# Patient Record
Sex: MALE | Race: WHITE | Employment: OTHER | URBAN - METROPOLITAN AREA
[De-identification: names, ages, dates, MRNs, and addresses within clinical notes are randomized per-mention and may not be internally consistent; named-entity substitution may affect disease eponyms.]

---

## 2021-06-14 ENCOUNTER — HOSPITAL ENCOUNTER (OUTPATIENT)
Dept: PREADMISSION TESTING | Age: 76
Discharge: HOME | End: 2021-06-14
Payer: COMMERCIAL

## 2021-06-14 VITALS
RESPIRATION RATE: 16 BRPM | HEART RATE: 104 BPM | OXYGEN SATURATION: 95 % | SYSTOLIC BLOOD PRESSURE: 127 MMHG | WEIGHT: 270 LBS | HEIGHT: 73 IN | DIASTOLIC BLOOD PRESSURE: 71 MMHG | BODY MASS INDEX: 35.78 KG/M2 | TEMPERATURE: 97.1 F

## 2021-06-14 LAB
ANION GAP SERPL CALC-SCNC: 5 MMOL/L (ref 5–15)
BUN SERPL-MCNC: 16 MG/DL (ref 6–20)
BUN/CREAT SERPL: 16 (ref 12–20)
CALCIUM SERPL-MCNC: 9.1 MG/DL (ref 8.5–10.1)
CHLORIDE SERPL-SCNC: 102 MMOL/L (ref 97–108)
CO2 SERPL-SCNC: 34 MMOL/L (ref 21–32)
CREAT SERPL-MCNC: 1 MG/DL (ref 0.7–1.3)
GLUCOSE SERPL-MCNC: 87 MG/DL (ref 65–100)
POTASSIUM SERPL-SCNC: 3.2 MMOL/L (ref 3.5–5.1)
SODIUM SERPL-SCNC: 141 MMOL/L (ref 136–145)

## 2021-06-14 PROCEDURE — 80048 BASIC METABOLIC PNL TOTAL CA: CPT

## 2021-06-14 PROCEDURE — 36415 COLL VENOUS BLD VENIPUNCTURE: CPT

## 2021-06-14 RX ORDER — LEVOTHYROXINE SODIUM 175 UG/1
175 TABLET ORAL EVERY OTHER DAY
COMMUNITY

## 2021-06-14 RX ORDER — AMLODIPINE BESYLATE 10 MG/1
10 TABLET ORAL DAILY
COMMUNITY

## 2021-06-14 RX ORDER — DILTIAZEM HYDROCHLORIDE 120 MG/1
360 CAPSULE, EXTENDED RELEASE ORAL DAILY
COMMUNITY

## 2021-06-14 RX ORDER — HYDRALAZINE HYDROCHLORIDE 25 MG/1
50 TABLET, FILM COATED ORAL
COMMUNITY

## 2021-06-14 RX ORDER — LEVOTHYROXINE SODIUM 150 UG/1
150 TABLET ORAL EVERY OTHER DAY
COMMUNITY

## 2021-06-14 RX ORDER — TRIAMTERENE/HYDROCHLOROTHIAZID 37.5-25 MG
1 TABLET ORAL DAILY
COMMUNITY

## 2021-06-14 RX ORDER — OLMESARTAN MEDOXOMIL 40 MG/1
40 TABLET ORAL DAILY
COMMUNITY

## 2021-06-14 NOTE — PERIOP NOTES
Patient here for pre-admission testing for upcoming surgery with Dr. Vivian Hawkins. Patient states he just saw his cardiologist today and presented with recent EKG. EKG is not signed or interpreted. Per RADHA Baez patient does not need to go for EKG today. Last cardiac note and PCP notes requested.

## 2021-06-14 NOTE — PERIOP NOTES
[address] [facility], [address] [facility]   PRE-ADMISSION TESTING    [phone]     Surgery Date:  6/21/2021 Monday      Is surgery arrival time given by surgeon? NO  If NO, [facility] staff will call you between 3 and 7pm the day before your surgery with your arrival time. (If your surgery is on a Monday, we will call you the Friday before.)    Call [phone] after 7pm Monday-Friday if you did not receive this call. INSTRUCTIONS BEFORE YOUR SURGERY   When You  Arrive Arrive at the [address] [facility] on the day of your surgery  Have your insurance card, photo ID, and any copayment (if needed)   Food   and   Drink NO food or drink after midnight the night before surgery    This means NO water, gum, mints, coffee, juice, etc.  No alcohol (beer, wine, liquor) 24 hours before and after surgery   Medications to   TAKE   Morning of Surgery MEDICATIONS TO TAKE THE MORNING OF SURGERY WITH A SIP OF WATER:    Norvasc   Diltiazem   Zyrtec if needed   Synthroid     Medications  To  STOP      7 days before surgery  Non-Steroidal anti-inflammatory Drugs (NSAID's): for example, Ibuprofen (Advil, Motrin), Naproxen (Aleve)   Aspirin, if taking for pain    Herbal supplements, vitamins, and fish oil   Other:  (Pain medications not listed above, including Tylenol may be taken)   Blood  Thinners  Per [place] Skates stop your Xarelto 2 days prior to surgery. Bathing Clothing  Jewelry  Valuables      If you shower the morning of surgery, please do not apply anything to your skin (lotions, powders, deodorant, or makeup, especially mascara)   Follow Chlorhexidine [employer] body wash instructions provided to you during PAT appointment. Begin 3 days prior to surgery.    Do not shave or trim anywhere 24 hours before surgery   Wear your hair loose or down; no pony-tails, buns, or metal hair clips   Wear loose, comfortable, clean clothes   Wear glasses instead of contacts  [address], valuables, and jewelry, including body piercings, at home   If you were given an [employer], bring it on day of surgery. Going Home - or Spending the Night  SAME-DAY SURGERY: You must have a responsible adult drive you home and stay with you 24 hours after surgery   ADMITS: If your doctor is keeping you in the hospital after surgery, leave personal belongings/luggage in your car until you have a hospital room number. Hospital discharge time is 12 noon  Drivers must be here before 12 noon unless you are told differently   Special Instructions No covid testing needed. Follow all instructions so your surgery wont be cancelled. Please, be on time. If a situation occurs and you are delayed the day of surgery, call [phone]. If your physical condition changes (like a fever, cold, flu, etc.) call your surgeon. Home medication(s) reviewed and verified verbally and with list during PAT appointment. The patient was contacted in person. The patient verbalizes understanding of all instructions and does not need reinforcement.

## 2021-06-15 NOTE — H&P
History and Physical    Patient: Estrada Haddad MRN: [id number]  SSN: [id number]    YOB: 1945  Age: 70 y.o. Sex: male      CC: Painful HW of Left Ankle    Subjective:      Estrada Haddad is a 70 y.o. male who has been sent for a pre-operative evaluation for surgery on 6/21/21. Fatimah Jackson notes the hardware is backing out of his left ankle. He notes it started a few weeks ago. He notes pain to the area. He is followed by Sania Driscoll with cardiology. He went today for cardiac clearance but note states he needs to have a ECHO prior to. He is unsure when this is but notes it is before Friday of this week. He is also on Xarelto but it is not on his medication list he brought in today. The cardiology note speaks to medical diagnosis of AFIB. He does see endocrinology but is unsure of their name. He notes he has been on the same dosage for 10 years with no changes. Past Medical History:   Diagnosis Date    Anticoagulated     Xarelto    COVID-19 vaccine series completed 04/15/2021    Moderna    Diverticulosis     HTN (hypertension)     Hyperlipemia     Hypothyroidism     Osteoarthritis     Paroxysmal A-fib (HCC)     Poor historian      Past Surgical History:   Procedure Laterality Date    HX ANKLE FRACTURE TX Left 2000    HX LUMBAR FUSION        Family History   Problem Relation Age of Onset    Anesth Problems Neg Hx      Social History     Tobacco Use    Smoking status: Never Smoker    Smokeless tobacco: Never Used   Substance Use Topics    Alcohol use: Never    Drug use: Never      Prior to Admission medications    Medication Sig Start Date End Date Taking? Authorizing Provider   ZINC PO Take 1 Tablet by mouth daily. Yes Provider, Historical   cholecalciferol, vitamin D3, (VITAMIN D3 PO) Take 5,000 Units by mouth daily. Yes Provider, Historical   vitamin E acetate (VITAMIN E PO) Take 1 Tablet by mouth daily.    Yes Provider, Historical   cetirizine HCl (ZYRTEC PO) Take 1 Tablet by mouth daily. Yes Provider, Historical   levothyroxine (Synthroid) 175 mcg tablet Take 175 mcg by mouth every other day. Yes Provider, Historical   levothyroxine (Synthroid) 150 mcg tablet Take 150 mcg by mouth every other day. Yes Provider, Historical   olmesartan (Benicar) 40 mg tablet Take 40 mg by mouth daily. Yes Provider, Historical   triamterene-hydroCHLOROthiazide (MAXZIDE) 37.5-25 mg per tablet Take 1 Tablet by mouth daily. Yes Provider, Historical   hydrALAZINE (APRESOLINE) 25 mg tablet Take 50 mg by mouth nightly. Yes Provider, Historical   dilTIAZem ER (DILACOR XR) 120 mg capsule Take 120 mg by mouth daily. Yes Provider, Historical   OTHER Take 1 Tablet by mouth daily. Leternano Zeaxanthin + Saw Palmetto   Yes Provider, Historical   amLODIPine (Norvasc) 10 mg tablet Take 10 mg by mouth daily. Yes Provider, Historical   rivaroxaban (Xarelto) 20 mg tab tablet Take 20 mg by mouth daily. Yes Provider, Historical        Allergies   Allergen Reactions    Valium [Diazepam] Other (comments)     Made him overstimulated- hit the doctor       Review of Systems:  Constitutional: Negative for chills and fever  HENT: Negative for congestion and sore throat  Eyes: negative for blurred vision and double vision  Respiratory: Negative for cough, shortness of breath and wheezing  Mouth: Negative for loose, broken or chipped teeth. Cardiovascular: Negative for chest pain and palpitations  Gastrointestinal: Negative for abdominal pain, constipation, diarrhea and nausea  Genitourinary: Negative for dysuria and hematuria  Musculoskeletal: Painful left ankle  Skin: Negative for rash, open wounds. Negative for bruises easily  Neurological: Negative for dizziness, tremors and headaches  Psychiatric: Negative for depression. The patient is not nervous/anxious.     Objective:     Vitals:    06/14/21 1504   BP: 127/71   Pulse: (!) 104   Resp: 16   Temp: 97.1 °F (36.2 °C)   SpO2: 95%   Weight: 122.5 kg (270 lb)   Height: 6' 1\" (1.854 m)        Physical Exam:  Constitutional:  Appears well,  No Acute Distress, Vitals noted  Psychiatric:   Affect normal, Alert and Oriented to person/place/time    Eyes:   Pupils equally round and reactive, EOMI, conjunctiva clear, eyelids normal  ENT:   External ears and nose normal, teeth normal, gums normal, TMs and Orophyarynx normal  Neck:   General inspection and Thyroid normal.  No abnormal cervical or supraclavicular nodes    Lungs:   Clear to auscultation, good respiratory effort  Heart: Ausculation normal.  Regular rhythm. Tachycardia. No cardiac murmurs. No carotid bruits or palpable thrills  Chest wall normal  Musculoskeletal: Normal gait  Extremities:   Without edema, good peripheral pulses  Skin:   Warm to palpation, without rashes, bruising, or suspicious lesions     Recent Results (from the past 168 hour(s))   METABOLIC PANEL, BASIC    Collection Time: 06/14/21  3:44 PM   Result Value Ref Range    Sodium 141 136 - 145 mmol/L    Potassium 3.2 (L) 3.5 - 5.1 mmol/L    Chloride 102 97 - 108 mmol/L    CO2 34 (H) 21 - 32 mmol/L    Anion gap 5 5 - 15 mmol/L    Glucose 87 65 - 100 mg/dL    BUN 16 6 - 20 MG/DL    Creatinine 1.00 0.70 - 1.30 MG/DL    BUN/Creatinine ratio 16 12 - 20      GFR est AA >60 >60 ml/min/1.73m2    GFR est non-AA >60 >60 ml/min/1.73m2    Calcium 9.1 8.5 - 10.1 MG/DL         Assessment:     Painful left ankle hardware    Plan:     Removal of hardware from left ankle    Patient is a very poor historian and does not know the answers to many of our questions. Cardiology note states he can stop his Xarelto 2 days prior to sx.  We will follow along and get final paperwork once final.     Signed By: Laron Figueroa NP     Archana 15, 2021

## 2021-06-18 NOTE — PERIOP NOTES
Called the cardiologist's office inquiring if the cardiac clearance note is ready to be faxed. The office to fax this document to PAT.   DOS: 6/21/2021

## 2021-06-18 NOTE — PERIOP NOTES
Received the cardiology OV note from today's visit. Per the note, the patient is still not rate controlled. Clearance was not provided. Notified Dr. Shruti Michelle office, who has already taken the patient off of the surgery schedule. Faxed the cardiology note to Dr Shruti Michelle office for their records.   DOS: 6/21/2021

## 2021-06-18 NOTE — PERIOP NOTES
ECHO results and final cardiac clearance requested from Dr. Wing Parker.  Per their office, the patient has an appointment with Dr. Wing Parker this morning at 11am.

## 2021-06-29 ENCOUNTER — ANESTHESIA EVENT (OUTPATIENT)
Dept: SURGERY | Age: 76
End: 2021-06-29
Payer: COMMERCIAL

## 2021-06-29 NOTE — PERIOP NOTES
Final cardiac clearance note has been received. The patient has much better rate control. He has been instructed by his cardiologist to take his Diltiazem with a sip of water the AM of surgery. He has also been provided with instructions regarding his Xarelto.   DOS: 6/30/2021

## 2021-06-30 ENCOUNTER — APPOINTMENT (OUTPATIENT)
Dept: GENERAL RADIOLOGY | Age: 76
End: 2021-06-30
Attending: PODIATRIST
Payer: COMMERCIAL

## 2021-06-30 ENCOUNTER — ANESTHESIA (OUTPATIENT)
Dept: SURGERY | Age: 76
End: 2021-06-30
Payer: COMMERCIAL

## 2021-06-30 ENCOUNTER — HOSPITAL ENCOUNTER (OUTPATIENT)
Age: 76
Setting detail: OUTPATIENT SURGERY
Discharge: HOME | End: 2021-06-30
Attending: PODIATRIST | Admitting: PODIATRIST
Payer: COMMERCIAL

## 2021-06-30 VITALS
TEMPERATURE: 98.2 F | OXYGEN SATURATION: 96 % | HEART RATE: 102 BPM | SYSTOLIC BLOOD PRESSURE: 128 MMHG | WEIGHT: 264.33 LBS | DIASTOLIC BLOOD PRESSURE: 62 MMHG | HEIGHT: 73 IN | BODY MASS INDEX: 35.03 KG/M2 | RESPIRATION RATE: 21 BRPM

## 2021-06-30 DIAGNOSIS — T84.84XA PAINFUL ORTHOPAEDIC HARDWARE (HCC): Primary | ICD-10-CM

## 2021-06-30 PROCEDURE — 76060000063 HC AMB SURG ANES 1.5 TO 2 HR: Performed by: PODIATRIST

## 2021-06-30 PROCEDURE — 77030040922 HC BLNKT HYPOTHRM STRY -A

## 2021-06-30 PROCEDURE — 77030036687 HC SHOE PSTOP S2SG -A

## 2021-06-30 PROCEDURE — 2709999900 HC NON-CHARGEABLE SUPPLY: Performed by: PODIATRIST

## 2021-06-30 PROCEDURE — 97530 THERAPEUTIC ACTIVITIES: CPT

## 2021-06-30 PROCEDURE — 77030020753 HC CUF TRNQT 1BLA STRY -B: Performed by: PODIATRIST

## 2021-06-30 PROCEDURE — 77030039497 HC CST PAD STERILE CHCS -A: Performed by: PODIATRIST

## 2021-06-30 PROCEDURE — 74011250636 HC RX REV CODE- 250/636: Performed by: ANESTHESIOLOGY

## 2021-06-30 PROCEDURE — 97161 PT EVAL LOW COMPLEX 20 MIN: CPT

## 2021-06-30 PROCEDURE — 64445 NJX AA&/STRD SCIATIC NRV IMG: CPT

## 2021-06-30 PROCEDURE — 74011000250 HC RX REV CODE- 250: Performed by: ANESTHESIOLOGY

## 2021-06-30 PROCEDURE — 97116 GAIT TRAINING THERAPY: CPT

## 2021-06-30 PROCEDURE — 76030000003 HC AMB SURG OR TIME 1.5 TO 2: Performed by: PODIATRIST

## 2021-06-30 PROCEDURE — 76210000057 HC AMBSU PH II REC 1 TO 1.5 HR: Performed by: PODIATRIST

## 2021-06-30 PROCEDURE — 77030000032 HC CUF TRNQT ZIMM -B: Performed by: PODIATRIST

## 2021-06-30 PROCEDURE — 77030002916 HC SUT ETHLN J&J -A: Performed by: PODIATRIST

## 2021-06-30 PROCEDURE — 77030040361 HC SLV COMPR DVT MDII -B

## 2021-06-30 PROCEDURE — 74011250636 HC RX REV CODE- 250/636: Performed by: PODIATRIST

## 2021-06-30 PROCEDURE — 77030020143 HC AIRWY LARYN INTUB CGAS -A: Performed by: ANESTHESIOLOGY

## 2021-06-30 PROCEDURE — 76210000040 HC AMBSU PH I REC FIRST 0.5 HR: Performed by: PODIATRIST

## 2021-06-30 PROCEDURE — 77030003601 HC NDL NRV BLK BBMI -A

## 2021-06-30 RX ORDER — LIDOCAINE HYDROCHLORIDE 10 MG/ML
0.1 INJECTION, SOLUTION EPIDURAL; INFILTRATION; INTRACAUDAL; PERINEURAL AS NEEDED
Status: DISCONTINUED | OUTPATIENT
Start: 2021-06-30 | End: 2021-06-30 | Stop reason: HOSPADM

## 2021-06-30 RX ORDER — SODIUM CHLORIDE, SODIUM LACTATE, POTASSIUM CHLORIDE, CALCIUM CHLORIDE 600; 310; 30; 20 MG/100ML; MG/100ML; MG/100ML; MG/100ML
125 INJECTION, SOLUTION INTRAVENOUS CONTINUOUS
Status: DISCONTINUED | OUTPATIENT
Start: 2021-06-30 | End: 2021-06-30 | Stop reason: HOSPADM

## 2021-06-30 RX ORDER — EPHEDRINE SULFATE/0.9% NACL/PF 50 MG/5 ML
SYRINGE (ML) INTRAVENOUS AS NEEDED
Status: DISCONTINUED | OUTPATIENT
Start: 2021-06-30 | End: 2021-06-30 | Stop reason: HOSPADM

## 2021-06-30 RX ORDER — ONDANSETRON 2 MG/ML
INJECTION INTRAMUSCULAR; INTRAVENOUS AS NEEDED
Status: DISCONTINUED | OUTPATIENT
Start: 2021-06-30 | End: 2021-06-30 | Stop reason: HOSPADM

## 2021-06-30 RX ORDER — FENTANYL CITRATE 50 UG/ML
INJECTION, SOLUTION INTRAMUSCULAR; INTRAVENOUS AS NEEDED
Status: DISCONTINUED | OUTPATIENT
Start: 2021-06-30 | End: 2021-06-30 | Stop reason: HOSPADM

## 2021-06-30 RX ORDER — DIPHENHYDRAMINE HYDROCHLORIDE 50 MG/ML
12.5 INJECTION, SOLUTION INTRAMUSCULAR; INTRAVENOUS AS NEEDED
Status: DISCONTINUED | OUTPATIENT
Start: 2021-06-30 | End: 2021-06-30 | Stop reason: HOSPADM

## 2021-06-30 RX ORDER — ROPIVACAINE HYDROCHLORIDE 5 MG/ML
INJECTION, SOLUTION EPIDURAL; INFILTRATION; PERINEURAL AS NEEDED
Status: DISCONTINUED | OUTPATIENT
Start: 2021-06-30 | End: 2021-06-30 | Stop reason: HOSPADM

## 2021-06-30 RX ORDER — PROPOFOL 10 MG/ML
INJECTION, EMULSION INTRAVENOUS AS NEEDED
Status: DISCONTINUED | OUTPATIENT
Start: 2021-06-30 | End: 2021-06-30 | Stop reason: HOSPADM

## 2021-06-30 RX ORDER — FLUMAZENIL 0.1 MG/ML
0.2 INJECTION INTRAVENOUS
Status: DISCONTINUED | OUTPATIENT
Start: 2021-06-30 | End: 2021-06-30 | Stop reason: HOSPADM

## 2021-06-30 RX ORDER — MIDAZOLAM HYDROCHLORIDE 1 MG/ML
INJECTION, SOLUTION INTRAMUSCULAR; INTRAVENOUS AS NEEDED
Status: DISCONTINUED | OUTPATIENT
Start: 2021-06-30 | End: 2021-06-30 | Stop reason: HOSPADM

## 2021-06-30 RX ORDER — LIDOCAINE HYDROCHLORIDE 20 MG/ML
INJECTION, SOLUTION EPIDURAL; INFILTRATION; INTRACAUDAL; PERINEURAL AS NEEDED
Status: DISCONTINUED | OUTPATIENT
Start: 2021-06-30 | End: 2021-06-30 | Stop reason: HOSPADM

## 2021-06-30 RX ORDER — PHENYLEPHRINE HCL IN 0.9% NACL 0.4MG/10ML
SYRINGE (ML) INTRAVENOUS AS NEEDED
Status: DISCONTINUED | OUTPATIENT
Start: 2021-06-30 | End: 2021-06-30 | Stop reason: HOSPADM

## 2021-06-30 RX ORDER — HYDROMORPHONE HYDROCHLORIDE 1 MG/ML
.25-1 INJECTION, SOLUTION INTRAMUSCULAR; INTRAVENOUS; SUBCUTANEOUS
Status: DISCONTINUED | OUTPATIENT
Start: 2021-06-30 | End: 2021-06-30 | Stop reason: HOSPADM

## 2021-06-30 RX ORDER — DEXAMETHASONE SODIUM PHOSPHATE 4 MG/ML
INJECTION, SOLUTION INTRA-ARTICULAR; INTRALESIONAL; INTRAMUSCULAR; INTRAVENOUS; SOFT TISSUE AS NEEDED
Status: DISCONTINUED | OUTPATIENT
Start: 2021-06-30 | End: 2021-06-30 | Stop reason: HOSPADM

## 2021-06-30 RX ORDER — NALOXONE HYDROCHLORIDE 0.4 MG/ML
0.2 INJECTION, SOLUTION INTRAMUSCULAR; INTRAVENOUS; SUBCUTANEOUS
Status: DISCONTINUED | OUTPATIENT
Start: 2021-06-30 | End: 2021-06-30 | Stop reason: HOSPADM

## 2021-06-30 RX ADMIN — Medication 150 MCG: at 12:20

## 2021-06-30 RX ADMIN — Medication 15 MG: at 11:59

## 2021-06-30 RX ADMIN — Medication 100 MCG: at 12:39

## 2021-06-30 RX ADMIN — Medication 80 MCG: at 12:08

## 2021-06-30 RX ADMIN — Medication 10 MG: at 12:08

## 2021-06-30 RX ADMIN — Medication 150 MCG: at 12:25

## 2021-06-30 RX ADMIN — Medication 80 MCG: at 12:03

## 2021-06-30 RX ADMIN — Medication 10 MG: at 11:56

## 2021-06-30 RX ADMIN — FENTANYL CITRATE 50 MCG: 50 INJECTION, SOLUTION INTRAMUSCULAR; INTRAVENOUS at 10:24

## 2021-06-30 RX ADMIN — Medication 80 MCG: at 12:05

## 2021-06-30 RX ADMIN — ONDANSETRON HYDROCHLORIDE 4 MG: 2 SOLUTION INTRAMUSCULAR; INTRAVENOUS at 11:40

## 2021-06-30 RX ADMIN — Medication 100 MCG: at 12:30

## 2021-06-30 RX ADMIN — Medication 15 MG: at 12:10

## 2021-06-30 RX ADMIN — CEFAZOLIN 3 G: 1 INJECTION, POWDER, FOR SOLUTION INTRAMUSCULAR; INTRAVENOUS; PARENTERAL at 11:32

## 2021-06-30 RX ADMIN — ROPIVACAINE HYDROCHLORIDE 30 ML: 5 INJECTION, SOLUTION EPIDURAL; INFILTRATION; PERINEURAL at 10:24

## 2021-06-30 RX ADMIN — Medication 200 MCG: at 12:28

## 2021-06-30 RX ADMIN — ROPIVACAINE HYDROCHLORIDE 10 ML: 5 INJECTION, SOLUTION EPIDURAL; INFILTRATION; PERINEURAL at 10:29

## 2021-06-30 RX ADMIN — FENTANYL CITRATE 25 MCG: 50 INJECTION, SOLUTION INTRAMUSCULAR; INTRAVENOUS at 12:52

## 2021-06-30 RX ADMIN — Medication 80 MCG: at 12:10

## 2021-06-30 RX ADMIN — FENTANYL CITRATE 25 MCG: 50 INJECTION, SOLUTION INTRAMUSCULAR; INTRAVENOUS at 12:41

## 2021-06-30 RX ADMIN — Medication 100 MCG: at 12:13

## 2021-06-30 RX ADMIN — LIDOCAINE HYDROCHLORIDE 80 MG: 20 INJECTION, SOLUTION INTRAVENOUS at 11:29

## 2021-06-30 RX ADMIN — MIDAZOLAM 2 MG: 1 INJECTION, SOLUTION INTRAMUSCULAR; INTRAVENOUS at 10:19

## 2021-06-30 RX ADMIN — Medication 100 MCG: at 12:33

## 2021-06-30 RX ADMIN — Medication 100 MCG: at 12:36

## 2021-06-30 RX ADMIN — Medication 100 MCG: at 12:15

## 2021-06-30 RX ADMIN — FENTANYL CITRATE 50 MCG: 50 INJECTION, SOLUTION INTRAMUSCULAR; INTRAVENOUS at 10:19

## 2021-06-30 RX ADMIN — SODIUM CHLORIDE, POTASSIUM CHLORIDE, SODIUM LACTATE AND CALCIUM CHLORIDE 125 ML/HR: 600; 310; 30; 20 INJECTION, SOLUTION INTRAVENOUS at 09:20

## 2021-06-30 RX ADMIN — FENTANYL CITRATE 25 MCG: 50 INJECTION, SOLUTION INTRAMUSCULAR; INTRAVENOUS at 12:43

## 2021-06-30 RX ADMIN — PROPOFOL 160 MG: 10 INJECTION, EMULSION INTRAVENOUS at 11:29

## 2021-06-30 RX ADMIN — DEXAMETHASONE SODIUM PHOSPHATE 4 MG: 4 INJECTION, SOLUTION INTRAMUSCULAR; INTRAVENOUS at 11:40

## 2021-06-30 RX ADMIN — FENTANYL CITRATE 25 MCG: 50 INJECTION, SOLUTION INTRAMUSCULAR; INTRAVENOUS at 12:54

## 2021-06-30 NOTE — PROGRESS NOTES
PHYSICAL THERAPY EVALUATION & DISCHARGE  (AMBULATORY SURGERY, EMERGENCY ROOM & RECOVERY ROOM PATIENTS)  Patient: Sara Baez (64 y.o. 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]  Left ankle pain, unspecified chronicity [M25.572]  Other mechanical complication of other internal orthopedic devices, implants and grafts, initial encounter ([facility] [T84.498A]  Osteophyte, left ankle [M25.772]  Procedure(s) (LRB):  LEFT ANKLE REMOVALOF PAINFUL HARDWARE AND LEFT FIBULA EXCISION OF BONE SPUR (BLOCK) (Left) Day of Surgery   Ordered Weight Bearing Status:  left non-weight  Ordered Equipment: crutches and YOLANDA Glaser; Discussed Knee Scooter    ASSESSMENT :   Based on the objective data described below, patient presents with orders NWB LLE following hardware removal. Modified independent level overall for transfers. Gait training completed at S/Modified independent, 10 feet and using a gait belt and rolling walker. Needs verbal cues to slow speed. Stairs were challenging and wife will be unable to support if patient to maintain NWB on LLE. Recommended scooting up and down vs amb and has neighbor that has 2 sons his size he may call upon to get into the home. Recommend HHPT follow up for home safety eval, fall prevention, and stair/gait training. Patient refused HHPT follow- up and failed when attempting to persuade him due to his concern regarding insurance coverage/expense. Discussed fall prevention, impportance of NWB  and HHPT with patient and his wife. Both verbalized good understanding. Discharge recommendations:above     PLAN :  Skilled intervention and education completed. Discharging further skilled acute physical therapy at this time.       SUBJECTIVE:   Patient stated .    OBJECTIVE DATA SUMMARY:   HISTORY:    Past Medical History:   Diagnosis Date    Anticoagulated     Xarelto    COVID-19 vaccine series completed 04/15/2021    Moderna    Diverticulosis     HTN (hypertension)     Hyperlipemia     Hypothyroidism     Osteoarthritis     Paroxysmal A-fib (HCC)     Poor historian      Past Surgical History:   Procedure Laterality Date    HX ANKLE FRACTURE TX Left 2000    HX LUMBAR FUSION       Prior Level of Function/Home Situation: complete independent w/o device  Personal factors and/or comorbidities impacting plan of care: NWB LLE post op, accelerated speed, impulsivity    Home Situation  Home Environment: Private residence  # Steps to Enter: 3  One/Two Story Residence: Two story  Lift Chair Available: No  Living Alone: No  Support Systems: Spouse/Significant Other/Partner  Patient Expects to be Discharged to[de-identified] House  Current DME Used/Available at Home: None    EXAMINATION/PRESENTATION/DECISION MAKING:     Critical Behavior:  alert and oriented x 4    Hearing: Auditory  Auditory Impairment: None  Hearing Aids/Status: Does not own    Strength and Range Of Motion limitations:  WFLs     Sensation:   WNLS except left ankle    Transfers:  Overall level of assistance required following instruction: modified independence given verbal using axillary crutches. Ambulation/gait training:  Weight bearing status during ambulation:          NWB LLE      Stair Management:  Ascend 4 steps with railing and crutch with mod A x 1 and verbal cues. Unable to descend backward or forward. Max x 2 to assist with turning on R foot at top of stairs        Pain:  0/10    Activity Tolerance:   Good      After treatment patient left:   Up in chair  RN notified     COMMUNICATION/EDUCATION:   Role of physical therapy explained to the patient. The patients plan of care was discussed with: Registered Nurse.      Topics addressed: Comments:   [x]                                    Device use and technique Issued crutches and RW for home   [x]                                    Transfer technique Advised to slow down and advised wife to monitor speed   [x] Gait training    [x]                                    Stair training Will call on two neighbors for getting down onto step and up from the floor today to enter home     Thank you for this referral.    Aline Sabillon, PT, DPT   Time Calculation: 34 mins

## 2021-06-30 NOTE — ANESTHESIA POSTPROCEDURE EVALUATION
Procedure(s):  LEFT ANKLE REMOVALOF PAINFUL HARDWARE AND LEFT FIBULA EXCISION OF BONE SPUR (BLOCK). regional, general    Anesthesia Post Evaluation        Patient location during evaluation: bedside  Level of consciousness: awake  Pain management: satisfactory to patient  Airway patency: patent  Anesthetic complications: no  Cardiovascular status: acceptable  Respiratory status: acceptable  Hydration status: acceptable        INITIAL Post-op Vital signs:   Vitals Value Taken Time   /64 06/30/21 1310   Temp 36.7 °C (98.1 °F) 06/30/21 1302   Pulse 106 06/30/21 1316   Resp 18 06/30/21 1316   SpO2 95 % 06/30/21 1316   Vitals shown include unvalidated device data.

## 2021-06-30 NOTE — ANESTHESIA PROCEDURE NOTES
Peripheral Block    Start time: 6/30/2021 10:19 AM  End time: 6/30/2021 10:28 AM  Performed by: Louisa Ivy MD  Authorized by: Louisa Ivy MD       Pre-procedure: Indications: at surgeon's request and post-op pain management    Preanesthetic Checklist: patient identified, risks and benefits discussed, site marked, timeout performed, anesthesia consent given and patient being monitored    Timeout Time: 10:19 EDT          Block Type:   Block Type:  Popliteal and adductor canal block  Laterality:  Left  Monitoring:  Continuous pulse ox, frequent vital sign checks, heart rate, responsive to questions and oxygen  Injection Technique:  Single shot  Procedures: ultrasound guided and nerve stimulator    Patient Position: supine  Prep: chlorhexidine    Needle Type:  Stimuplex  Needle Gauge:  21 G  Needle Localization:  Nerve stimulator and ultrasound guidance    Assessment:  Number of attempts:  1  Injection Assessment:  Incremental injection every 5 mL, local visualized surrounding nerve on ultrasound, negative aspiration for blood, no paresthesia and no intravascular symptoms  Patient tolerance:  Patient tolerated the procedure well with no immediate complications  Popliteal/Sciatic block performed with nerve stimulation and landmark identification. Needle used was 4\" stimuplex 21g. 20cc 0.2% ropivacaine injected intermittently with negative aspiration.

## 2021-06-30 NOTE — ANESTHESIA PREPROCEDURE EVALUATION
Relevant Problems   No relevant active problems       Anesthetic History   No history of anesthetic complications            Review of Systems / Medical History  Patient summary reviewed and pertinent labs reviewed    Pulmonary  Within defined limits                 Neuro/Psych   Within defined limits           Cardiovascular    Hypertension: well controlled        Dysrhythmias : atrial fibrillation      Exercise tolerance: >4 METS  Comments: EF 55%   GI/Hepatic/Renal  Within defined limits              Endo/Other      Hypothyroidism: well controlled  Arthritis     Other Findings              Physical Exam    Airway  Mallampati: III  TM Distance: 4 - 6 cm  Neck ROM: normal range of motion   Mouth opening: Normal     Cardiovascular    Rhythm: regular  Rate: normal         Dental    Dentition: Upper dentition intact and Lower dentition intact     Pulmonary  Breath sounds clear to auscultation               Abdominal         Other Findings            Anesthetic Plan    ASA: 3  Anesthesia type: regional and general - popliteal fossa block and saphenous block      Post-op pain plan if not by surgeon: peripheral nerve block single    Induction: Intravenous  Anesthetic plan and risks discussed with: Patient

## 2021-06-30 NOTE — DISCHARGE INSTRUCTIONS
DISCHARGE SUMMARY from your Nurse      PATIENT INSTRUCTIONS    After general anesthesia or intravenous sedation, for 24 hours or while taking prescription Narcotics:  · Limit your activities  · Do not drive and operate hazardous machinery  · Do not make important personal or business decisions  · Do  not drink alcoholic beverages  · If you have not urinated within 8 hours after discharge, please contact your surgeon on call. Report the following to your surgeon:  · Excessive pain, swelling, redness or odor of or around the surgical area  · Temperature over 100.5  · Nausea and vomiting lasting longer than 4 hours or if unable to take medications  · Any signs of decreased circulation or nerve impairment to extremity: change in color, persistent  numbness, tingling, coldness or increase pain  · Any questions      GOOD HELP TO FIGHT AN INFECTION  Here are a few tip to help reduce the chance of getting an infection after surgery:   Wash Your Hands   Good handwashing is the most important thing you and your caregiver can do.  Wash before and after caring for any wounds. Dry your hand with a clean towel.  Wash with soap and water for at least 20 seconds. A TIP: sing the \"Happy Birthday\" song through one time while washing to help with the timing.  Use a hand  in between washings.  Shower   When your surgeon says it is OK to take a shower, use a new bar of antibacterial soap (if that is what you use, and keep that bar of soap ONLY for your use), or antibacterial body wash.  Use a clean wash cloth or sponge when you bathe.  Dry off with a clean towel  after every bath - be careful around any wounds, skin staples, sutures or surgical glue over/on wounds.  Do not enter swimming pools, hot tubs, lakes, rivers and/or ocean until wounds are healed and your doctor/surgeon says it is OK.  Use Clean Sheets   Sleep on freshly laundered sheets after your surgery.    Keep the surgery site covered with a clean, dry bandage (if instructed to do so). If the bandage becomes soiled, reapply a new, dry, clean bandage.  Do not allow pets to sleep with you while your wound is healing.  Lifestyle Modification and Controlling Your Blood Sugar   Smoking slows wound healing. Stop smoking and limit exposure to second-hand smoke.  High blood sugar slows wound healing. Eat a well-balanced diet to provide proper nutrition while healing   Monitor your blood sugar (if you are a diabetic) and take your medications as you are suppose to so you can control you blood sugar after surgery. COUGH AND DEEP BREATHE    Breathing deeply and coughing are very important exercises to do after surgery. Deep breathing and coughing open the little air tubes and air sacks in your lungs. You take deep breaths every day. You may not even notice - it is just something you do when you sigh or yawn. It is a natural exercise you do to keep these air passages open. After surgery, take deep breaths and cough, on purpose. DIRECTIONS:  · Take 10 to 15 slow deep breaths every hour while awake. · Breathe in deeply, and hold it for 2 seconds. · Exhale slowly through puckered lips, like blowing up a balloon. · After every 4th or 5th deep breath, hug your pillow to your chest or belly and give a hard, deep cough. Yes, it will probably hurt. But doing this exercise is a very important part of healing after surgery. Take your pain medicine to help you do this exercise without too much pain. Coughing and deep breathing help prevent bronchitis and pneumonia after surgery. If you had chest or belly surgery, use a pillow as a \"hug isidro\" and hold it tightly to your chest or belly when you cough. ANKLE PUMPS    Ankle pumps increase the circulation of oxygenated blood to your lower extremities and decrease your risk for circulation problems such as blood clots.  They also stretch the muscles, tendons and ligaments in your foot and ankle, and prevent joint contracture in the ankle and foot, especially after surgeries on the legs. It is important to do ankle pump exercises regularly after surgery because immobility increases your risk for developing a blood clot. Your doctor may also have you take an Aspirin for the next few days as well. If your doctor did not ask you to take an Aspirin, consult with him before starting Aspirin therapy on your own. The exercise is quite simple. · Slowly point your foot forward, feeling the muscles on the top of your lower leg stretch, and hold this position for 5 seconds. · Next, pull your foot back toward you as far as possible, stretching the calf muscles, and hold that position for 5 seconds. · Repeat with the other foot. · Perform 10 repetitions every hour while awake for both ankles if possible (down and then up with the foot once is one repetition). You should feel gentle stretching of the muscles in your lower leg when doing this exercise. If you feel pain, or your range of motion is limited, don't push too hard. Only go the limit your joint and muscles will let you go. If you have increasing pain, progressively worsening leg warmth or swelling, STOP the exercise and call your doctor. MEDICATION AND   SIDE EFFECT GUIDE    The [facility] MEDICATION AND SIDE EFFECT GUIDE was provided to the PATIENT AND CARE PROVIDER. Information provided includes instruction about drug purpose and common side effects for the following medications:   · Oxycodone        These are general instructions for a healthy lifestyle:    *   Please give a list of your current medications to your Primary Care Provider. *   Please update this list whenever your medications are discontinued, doses are changed, or new medications (including over-the-counter products) are added.   *   Please carry medication information at all times in case of emergency situations. About Smoking  No smoking / No tobacco products  Avoid exposure to second hand smoke     Surgeon General's Warning:  Quitting smoking now greatly reduces serious risk to your health. Obesity, smoking, and sedentary lifestyle greatly increases your risk for illness and disease. A healthy diet, regular physical exercise & weight monitoring are important for maintaining a healthy lifestyle. Congestive Heart Failure  You may be retaining fluid if you have a history of heart failure or if you experience any of the following symptoms:  Weight gain of 3 pounds or more overnight or 5 pounds in a week, increased swelling in your hands or feet or shortness of breath while lying flat in bed. Please call your doctor as soon as you notice any of these symptoms; do not wait until your next office visit. Recognize signs and symptoms of STROKE:  F -  Face looks uneven  A -  Arms unable to move or move evenly  S -  Speech slurred or non-existent  T -  Time-call 911 as soon as signs and symptoms begin-DO NOT go          back to bed or wait to see if you get better-TIME IS BRAIN. Warning Signs of HEART ATTACK   Call 911 if you have these symptoms:     Chest discomfort. Most heart attacks involve discomfort in the center of the chest that lasts more than a few minutes, or that goes away and comes back. It can feel like uncomfortable pressure, squeezing, fullness, or pain.  Discomfort in other areas of the upper body. Symptoms can include pain or discomfort in one or both arms, the back, neck, jaw, or stomach.  Shortness of breath with or without chest discomfort.  Other signs may include breaking out in a cold sweat, nausea, or lightheadedness. Don't wait more than five minutes to call 911 - MINUTES MATTER! Fast action can save your life. Calling 911 is almost always the fastest way to get lifesaving treatment.  Emergency Medical Services staff can begin treatment when they arrive -- up to an hour sooner than if someone gets to the hospital by car. Learning About Coronavirus [phone])  Coronavirus [phone]): Overview  What is coronavirus (COVID-19)? The coronavirus disease (COVID-19) is caused by a virus. It is an illness that was first found in [place], [place], in December 2019. It has since spread worldwide. The virus can cause fever, cough, and trouble breathing. In severe cases, it can cause pneumonia and make it hard to breathe without help. It can cause death. Coronaviruses are a large group of viruses. They cause the common cold. They also cause more serious illnesses like Middle East respiratory syndrome (MERS) and severe acute respiratory syndrome (SARS). COVID-19 is caused by a novel coronavirus. That means it's a new type that has not been seen in people before. This virus spreads person-to-person through droplets from coughing and sneezing. It can also spread when you are close to someone who is infected. And it can spread when you touch something that has the virus on it, such as a doorknob or a tabletop. What can you do to protect yourself from coronavirus (COVID-19)? The best way to protect yourself from getting sick is to:  · Avoid areas where there is an outbreak. · Avoid contact with people who may be infected. · Wash your hands often with soap or alcohol-based hand sanitizers. · Avoid crowds and try to stay at least 6 feet away from other people. · Wash your hands often, especially after you cough or sneeze. Use soap and water, and scrub for at least 20 seconds. If soap and water aren't available, use an alcohol-based hand . · Avoid touching your mouth, nose, and eyes. What can you do to avoid spreading the virus to others? To help avoid spreading the virus to others:  · Cover your mouth with a tissue when you cough or sneeze. Then throw the tissue in the trash. · Use a disinfectant to clean things that you touch often.   · Stay home if you are sick or have been exposed to the virus. Don't go to school, work, or public areas. And don't use public transportation. · If you are sick:  ? Leave your home only if you need to get medical care. But call the doctor's office first so they know you're coming. And wear a face mask, if you have one.  ? If you have a face mask, wear it whenever you're around other people. It can help stop the spread of the virus when you cough or sneeze. ? Clean and disinfect your home every day. Use household  and disinfectant wipes or sprays. Take special care to clean things that you grab with your hands. These include doorknobs, remote controls, phones, and handles on your refrigerator and microwave. And don't forget countertops, tabletops, bathrooms, and computer keyboards. When to call for help  Call 911 anytime you think you may need emergency care. For example, call if:  · You have severe trouble breathing. (You can't talk at all.)  · You have constant chest pain or pressure. · You are severely dizzy or lightheaded. · You are confused or can't think clearly. · Your face and lips have a blue color. · You pass out (lose consciousness) or are very hard to wake up. Call your doctor now if you develop symptoms such as:  · Shortness of breath. · Fever. · Cough. If you need to get care, call ahead to the doctor's office for instructions before you go. Make sure you wear a face mask, if you have one, to prevent exposing other people to the virus. Where can you get the latest information? The following health organizations are tracking and studying this virus. Their websites contain the most up-to-date information. Bryon Hayward also learn what to do if you think you may have been exposed to the virus. · U.S. Centers for Disease Control and Prevention (CDC): The CDC provides updated news about the disease and travel advice. The website also tells you how to prevent the spread of infection.  www.cdc.gov  · [address] Organization (WHO): WHO offers information about the virus outbreaks. WHO also has travel advice. www.who.int  Current as of: April 1, 2020               Content Version: 12.4  © 2006-2020 Health[employer]orporated. Care instructions adapted under license by your healthcare professional. If you have questions about a medical condition or this instruction, always ask your healthcare professional. [address] any warranty or liability for your use of this information. The discharge information has been reviewed with the spouse. Any questions and concerns from the spouse have been addressed. The spouse verbalized understanding. CONTENTS FOUND IN YOUR DISCHARGE ENVELOPE:  [x]     Surgeon and Hospital Discharge Instructions  [x]     [facility] Care Provider Card  []     Medication & Side Effect Guide            (your newly prescribed medications have been marked/highlighted showing the most common side effects from   the classes of drugs on your prescriptions)  []     Medication Prescription(s) x Oxycodone ( [x] These have been sent electronically to your pharmacy by your surgeon,   - OR -       your surgeon has already provided these to you during a previous/pre-op office visit)  []     [address]  []     Physical Therapy Prescription  []     Follow-up Appointment Cards  []     Surgery-related Pictures/Media  []     Pain block and/or block with On-Q Catheter from Anesthesia Service (information included in your instructions above)  []     Medical device information sheets/pamphlets from their    []     School/work excuse note. []     /parent work excuse note. The following personal items collected during your admission are returned to you:   Dental Appliance: Dental Appliances: None  Vision: Visual Aid: None  Hearing Aid:    Jewelry: Jewelry: None  Clothing: Clothing:  Footwear, Undergarments, Socks, Pants, Shirt, Brett  Other Valuables:  Other Valuables: None  Valuables sent to safe:

## 2021-06-30 NOTE — BRIEF OP NOTE
Brief Postoperative Note    Patient: Karina Silva  YOB: 1945  MRN: [id number]    Date of Procedure: 6/30/2021     Pre-Op Diagnosis: Post-traumatic osteoarthritis of left foot [M19.172]  Left ankle pain, unspecified chronicity [M25.572]  Other mechanical complication of other internal orthopedic devices, implants and grafts, [facility] [T84.498A]  Osteophyte, left ankle [M25.772]    Post-Op Diagnosis: Same as preoperative diagnosis. Procedure(s):  1. LEFT ANKLE REMOVALOF PAINFUL HARDWARE   2. LEFT FIBULA EXCISION OF BONE SPUR    Surgeon(s):  Tim Lopez DPM    Surgical Assistant: Surg Asst-1: Yara CHAMPION    Anesthesia: General     Estimated Blood Loss (mL): Minimal    Complications: None    Specimens: * No specimens in log *     Implants: * No implants in log *    Drains: * No LDAs found *    Findings: Failed hardware. See operative report for details.     Electronically Signed by Gordon Steele DPM on 6/30/2021 at 12:49 PM

## 2021-07-01 NOTE — OP NOTES
[facility]  OPERATIVE REPORT    Name:  Sydnie Wan  MR#:  [id number]  :  1945  ACCOUNT #:  [de-identified]  DATE OF SERVICE:  2021    PREOPERATIVE DIAGNOSES:  1.  Painful orthopedic hardware, left ankle. 2.  Left fibula osteophyte. POSTOPERATIVE DIAGNOSES:  1.  Painful orthopedic hardware, left ankle. 2.  Left fibula osteophyte. PROCEDURES PERFORMED:  1. Left ankle removal of painful hardware. 2.  Left ankle excision of fibular exostosis. SURGEON:  Elinor Muro DPM    ASSISTANT:  Alber Del Toro    ANESTHESIA:  General and regional block. COMPLICATIONS:  None. SPECIMENS REMOVED:  None. ESTIMATED BLOOD LOSS:  Minimal.    HEMOSTASIS:  Thigh tourniquet at 300 mmHg. MATERIALS/IMPLANTS:  None. INJECTABLES:  None. INDICATIONS FOR PROCEDURE:  This patient is a 77-year-old male patient of mine presenting with left ankle pain secondary to the above-mentioned diagnosis. The patient had a left foot ankle fracture many years ago that was open reduced with an internal fixation. The distal fibular screws had started backing out of the plate and the ankle hardware is now causing pain. The patient has exhausted all conservative measures at this time and has elected to undergo surgical intervention. All risks, benefits, indications, complications and alternatives were discussed at length with patient. All questions were answered to the patient's apparent satisfaction. The signed informed consent was placed in the chart. PROCEDURE:  The patient was brought from the preoperative holding area to the operating room and placed on the operating room table in a secured supine position. The above-mentioned anesthesia was administered per the Anesthesia team.  A well-padded thigh tourniquet was applied to the left lower extremity. The left lower extremity was then prepped and draped utilizing sterile aseptic technique and ChloraPrep scrub.   A time-out was performed and all were in agreement. The left lower extremity was then elevated and tourniquet was inflated to 300 mmHg. Attention was directed to the lateral aspect of the left ankle. There was noted to be three prominent screw heads distally that were impinging onto the skin. Utilizing a #15 blade, a linear incision was made over the lateral plate and screws. The incision was carried down to skin and subcutaneous tissue with care to protect all neurovascular structures. The prominent screws were then explanted. The remaining plate and screw were then identified and explanted as well along with the plate. There was noted to be a fibular exostosis distally that was also impinging on the skin. Utilizing an rongeur, the osteophyte was resected. The bone was then smoothed down with an electric rasp. The incision site was then flushed with copious amounts of normal sterile saline and the incision site was then closed in layers utilizing Vicryl for deep tissue and nylon for skin. The incision site was then dressed with Xeroform, 4x4's, ABD's, Kalyani and a 4 inches bandage. The tourniquet was let down and a prompt hyperemic response was noted to all the digits. The patient tolerated the procedure and anesthesia well without complications. The patient was transferred from the OR to the PACU with vital signs stable and vascular status unchanged to the left lower extremity. The patient will be discharged home per PACU protocol and will follow up with me in my office for postoperative care.       BA Virk/[id number]  D:  06/30/2021 13:06  T:  06/30/2021 22:14  JOB #:  [id number]

## 2021-10-20 ENCOUNTER — HOSPITAL ENCOUNTER (OUTPATIENT)
Dept: PREADMISSION TESTING | Age: 76
Discharge: HOME | End: 2021-10-20
Payer: COMMERCIAL

## 2021-10-20 VITALS
DIASTOLIC BLOOD PRESSURE: 74 MMHG | BODY MASS INDEX: 35.94 KG/M2 | HEIGHT: 73 IN | RESPIRATION RATE: 18 BRPM | SYSTOLIC BLOOD PRESSURE: 142 MMHG | HEART RATE: 89 BPM | TEMPERATURE: 98.4 F | WEIGHT: 271.2 LBS | OXYGEN SATURATION: 94 %

## 2021-10-20 LAB
ALBUMIN SERPL-MCNC: 3.6 G/DL (ref 3.5–5)
ALBUMIN/GLOB SERPL: 1.2 {RATIO} (ref 1.1–2.2)
ALP SERPL-CCNC: 91 U/L (ref 45–117)
ALT SERPL-CCNC: 22 U/L (ref 12–78)
ANION GAP SERPL CALC-SCNC: 5 MMOL/L (ref 5–15)
APPEARANCE UR: CLEAR
AST SERPL-CCNC: 14 U/L (ref 15–37)
BACTERIA URNS QL MICRO: NEGATIVE /HPF
BASOPHILS # BLD: 0.1 K/UL (ref 0–0.1)
BASOPHILS NFR BLD: 1 % (ref 0–1)
BILIRUB SERPL-MCNC: 0.6 MG/DL (ref 0.2–1)
BILIRUB UR QL: NEGATIVE
BUN SERPL-MCNC: 15 MG/DL (ref 6–20)
BUN/CREAT SERPL: 18 (ref 12–20)
CALCIUM SERPL-MCNC: 8.8 MG/DL (ref 8.5–10.1)
CHLORIDE SERPL-SCNC: 101 MMOL/L (ref 97–108)
CO2 SERPL-SCNC: 31 MMOL/L (ref 21–32)
COLOR UR: NORMAL
CREAT SERPL-MCNC: 0.82 MG/DL (ref 0.7–1.3)
DIFFERENTIAL METHOD BLD: ABNORMAL
EOSINOPHIL # BLD: 0.1 K/UL (ref 0–0.4)
EOSINOPHIL NFR BLD: 2 % (ref 0–7)
EPITH CASTS URNS QL MICRO: NORMAL /LPF
ERYTHROCYTE [DISTWIDTH] IN BLOOD BY AUTOMATED COUNT: 14 % (ref 11.5–14.5)
EST. AVERAGE GLUCOSE BLD GHB EST-MCNC: 111 MG/DL
GLOBULIN SER CALC-MCNC: 3.1 G/DL (ref 2–4)
GLUCOSE SERPL-MCNC: 80 MG/DL (ref 65–100)
GLUCOSE UR STRIP.AUTO-MCNC: NEGATIVE MG/DL
HBA1C MFR BLD: 5.5 % (ref 4–5.6)
HCT VFR BLD AUTO: 43.4 % (ref 36.6–50.3)
HGB BLD-MCNC: 14.3 G/DL (ref 12.1–17)
HGB UR QL STRIP: NEGATIVE
IMM GRANULOCYTES # BLD AUTO: 0.1 K/UL (ref 0–0.04)
IMM GRANULOCYTES NFR BLD AUTO: 1 % (ref 0–0.5)
KETONES UR QL STRIP.AUTO: NEGATIVE MG/DL
LEUKOCYTE ESTERASE UR QL STRIP.AUTO: NEGATIVE
LYMPHOCYTES # BLD: 1.2 K/UL (ref 0.8–3.5)
LYMPHOCYTES NFR BLD: 15 % (ref 12–49)
MCH RBC QN AUTO: 30.2 PG (ref 26–34)
MCHC RBC AUTO-ENTMCNC: 32.9 G/DL (ref 30–36.5)
MCV RBC AUTO: 91.6 FL (ref 80–99)
MONOCYTES # BLD: 0.8 K/UL (ref 0–1)
MONOCYTES NFR BLD: 9 % (ref 5–13)
NEUTS SEG # BLD: 6.3 K/UL (ref 1.8–8)
NEUTS SEG NFR BLD: 72 % (ref 32–75)
NITRITE UR QL STRIP.AUTO: NEGATIVE
NRBC # BLD: 0 K/UL (ref 0–0.01)
NRBC BLD-RTO: 0 PER 100 WBC
PH UR STRIP: 8 [PH] (ref 5–8)
PLATELET # BLD AUTO: 275 K/UL (ref 150–400)
PMV BLD AUTO: 9.2 FL (ref 8.9–12.9)
POTASSIUM SERPL-SCNC: 3.7 MMOL/L (ref 3.5–5.1)
PROT SERPL-MCNC: 6.7 G/DL (ref 6.4–8.2)
PROT UR STRIP-MCNC: NEGATIVE MG/DL
RBC # BLD AUTO: 4.74 M/UL (ref 4.1–5.7)
RBC #/AREA URNS HPF: NORMAL /HPF (ref 0–5)
SODIUM SERPL-SCNC: 137 MMOL/L (ref 136–145)
SP GR UR REFRACTOMETRY: 1.01 (ref 1–1.03)
UA: UC IF INDICATED,UAUC: NORMAL
UROBILINOGEN UR QL STRIP.AUTO: 0.2 EU/DL (ref 0.2–1)
WBC # BLD AUTO: 8.6 K/UL (ref 4.1–11.1)
WBC URNS QL MICRO: NORMAL /HPF (ref 0–4)

## 2021-10-20 PROCEDURE — 81001 URINALYSIS AUTO W/SCOPE: CPT

## 2021-10-20 PROCEDURE — 83036 HEMOGLOBIN GLYCOSYLATED A1C: CPT

## 2021-10-20 PROCEDURE — 36415 COLL VENOUS BLD VENIPUNCTURE: CPT

## 2021-10-20 PROCEDURE — 85025 COMPLETE CBC W/AUTO DIFF WBC: CPT

## 2021-10-20 PROCEDURE — 80053 COMPREHEN METABOLIC PANEL: CPT

## 2021-10-20 RX ORDER — OMEPRAZOLE 20 MG/1
20 CAPSULE, DELAYED RELEASE ORAL DAILY
COMMUNITY

## 2021-10-20 RX ORDER — ALPRAZOLAM 0.25 MG/1
0.25 TABLET ORAL
COMMUNITY

## 2021-10-20 NOTE — PERIOP NOTES
[address] [facility], [address] [facility]   MAIN OR                                  [phone]   MAIN PRE OP                          [phone]                                                                                AMBULATORY PRE OP          [phone]  PRE-ADMISSION TESTING    [phone]   Surgery Date:  Tuesday 11/2/21       Is surgery arrival time given by surgeon? NO  If NO, [facility] staff will call you between 3 and 7pm the day before your surgery with your arrival time. (If your surgery is on a Monday, we will call you the Friday before.)    Call [phone] after 7pm Monday-Friday if you did not receive this call. INSTRUCTIONS BEFORE YOUR SURGERY   When You  Arrive Arrive at the [address] [facility] on the day of your surgery  Have your insurance card, photo ID, and any copayment (if needed)   Food   and   Drink NO food or drink after midnight the night before surgery    This means NO water, gum, mints, coffee, juice, etc.  No alcohol (beer, wine, liquor) 24 hours before and after surgery   Medications to   TAKE   Morning of Surgery MEDICATIONS TO TAKE THE MORNING OF SURGERY WITH A SIP OF WATER:    Synthroid    Amlodipine   Diltiazem   Medications  To  STOP      7 days before surgery  Non-Steroidal anti-inflammatory Drugs (NSAID's): for example, Ibuprofen (Advil, Motrin), Naproxen (Aleve)   Aspirin, if taking for pain    Herbal supplements, vitamins, and fish oil   Other:  (Pain medications not listed above, including Tylenol may be taken)   Blood  Thinners  If you take  Aspirin, Plavix, Coumadin, or any blood-thinning or anti-blood clot medicine, talk to the doctor who prescribed the medications for pre-operative instructions.    Stop Xarelto 3 days prior to surgery   Bathing Clothing  Jewelry  Valuables      If you shower the morning of surgery, please do not apply anything to your skin (lotions, powders, deodorant, or makeup, especially mascara)   Follow Chlorhexidine Care Fusion body wash instructions provided to you during PAT appointment. Begin 3 days prior to surgery.  Do not shave or trim anywhere 24 hours before surgery   Wear your hair loose or down; no pony-tails, buns, or metal hair clips   Wear loose, comfortable, clean clothes   Wear glasses instead of contacts  [employer] money, valuables, and jewelry, including body piercings, at home   If you were given an [employer], bring it on day of surgery. Going Home - or Spending the Night  SAME-DAY SURGERY: You must have a responsible adult drive you home and stay with you 24 hours after surgery   ADMITS: If your doctor is keeping you in the hospital after surgery, leave personal belongings/luggage in your car until you have a hospital room number. Hospital discharge time is 12 noon  Drivers must be here before 12 noon unless you are told differently   Special Instructions It is now mandated that all surgical patients be tested for COVID-19 prior to surgery. Testing has to be exactly 4 days prior to surgery. Your COVID test date is Thursday 10/28/21 between 8:00 am and 11:00 am.       COVID testing will be performed curbside at the [facility]. There will be signs leading you to the testing site. You will need to bring a photo ID with you to be swabbed. Patients are advised to self-quarantine at home after testing and prior to your surgery date. You will be notified if your results are positive.     What to watch for:   Coronavirus (COVID-19) affects different people in different ways   It also appears with a wide range of symptoms from mild to severe   Signs usually appear 2-14 days after exposure     If you develop any of the following, notify your doctor immediately:  o Fever  o Chills, with or without a shiver  o Muscle pain  o Headache  o Sore throat  o Dry cough  o New loss of taste or smell  o Tiredness      If you develop any of the following, call [phone]:  o Shortness of breath  o Difficulty breathing  o Chest pain  o New confusion  o Blueness of fingers and/or lips       Follow all instructions so your surgery wont be cancelled. Please, be on time. If a situation occurs and you are delayed the day of surgery, call [phone] or [phone]. If your physical condition changes (like a fever, cold, flu, etc.) call your surgeon. Home medication(s) reviewed and verified via     LIST   VERBAL   during PAT appointment. The patient was contacted by    IN-PERSON    The patient verbalizes understanding of all instructions and      DOES NOT   need reinforcement.

## 2021-10-20 NOTE — H&P
Preoperative Evaluation                     History and Physical with Surgical Risk Stratification     10/20/2021    CC: Left knee pain    HPI:   Roxanne Tamayo is a 78 y.o. male referred for pre-operative evaluation by Dr. Lisa Gonzalez for surgery on 11/2/21. Lavonne Durbin notes his knee has been hurting for a \"long time\". He will do his left knee first and then the right one. Denies buckling or falls. Walking and standing make his pain worse. He has no pain with sitting. Denies swelling. Pain stays localized. He is followed by Deirdre Marie for his cardiac health. He is currently on Xarelto. He has been cleared for surgery. The patient was evaluated in the surgeon's office and it was determined that the most appropriate plan of care is to proceed with surgical intervention. Patient's PCP None    Review of Systems     Constitutional: Negative for chills and fever  Throat: Negative for congestion and sore throat  Eyes: Negative for blurred vision and double vision  Respiratory: Negative for cough, shortness of breath and wheezing  Mouth: Negative for loose, broken or chipped teeth. Cardiovascular: Negative for chest pain and palpitations  Gastrointestinal: Negative for abdominal pain, nausea, diarrhea & constipation  Genitourinary: Negative for dysuria and hematuria  Musculoskeletal: Left & Right knee pain  Skin: Negative for rash, open wounds.    Neurological: Negative for dizziness, headaches, tremors  Psychiatric: Negative for anxiety    Inherent Risk of Surgery     Surgical risk:  Intermediate  Intermediate:  Joint Replacement, Spinal surgery, abdominal, thoracic, carotid endarterctomy, prostate, head and neck    Patient Cardiac Risk Assessment     Revised Cardiac Risk Index (RCRI)    Rate if cardiac death, nonfatal MI, nonfatal cardiac arrest by number of risk factor- 0.4%    [place]/AHA 2007 Guidelines:   1) Surgery Emergency, Non-cardiac -> to surgery  2) If not, look at clinical predictors    Intermediate Minor   Abnormal EKG  Blood Thinner: Xarelto    METS      EQUAL TO 4 Care for self Walk indoors around house Walk 2-3 blocks on level ground (2-3 mph) Light work around house (dust, dishes)     Other Risk Factors:   Screening for ETOH use:  Done and low risk  Smoking status:  Never   Marijuana Use:  No    Personal or FH of bleeding problems:  No  Personal or FH of blood clots:  No  Personal or FH of anesthesia problems:   No    Pulmonary Risk:  Asthma or COPD:  No  Body mass index is 35.78 kg/m². Known JESSI:  No    Past Medical, Surgical, Social History     Allergies: Allergies   Allergen Reactions    Valium [Diazepam] Other (comments)     Made him overstimulated- hit the doctor         Current Outpatient Medications:     omeprazole (PRILOSEC) 20 mg capsule, Take 20 mg by mouth daily. , Disp: , Rfl:     ALPRAZolam (Xanax) 0.25 mg tablet, Take 0.25 mg by mouth two (2) times daily as needed for Anxiety. , Disp: , Rfl:     ZINC PO, Take 1 Tablet by mouth daily. , Disp: , Rfl:     cholecalciferol, vitamin D3, (VITAMIN D3 PO), Take 5,000 Units by mouth daily. , Disp: , Rfl:     vitamin E acetate (VITAMIN E PO), Take 1 Tablet by mouth daily. , Disp: , Rfl:     cetirizine HCl (ZYRTEC PO), Take 1 Tablet by mouth daily. , Disp: , Rfl:     levothyroxine (Synthroid) 175 mcg tablet, Take 175 mcg by mouth every other day., Disp: , Rfl:     levothyroxine (Synthroid) 150 mcg tablet, Take 150 mcg by mouth every other day., Disp: , Rfl:     olmesartan (Benicar) 40 mg tablet, Take 40 mg by mouth daily. , Disp: , Rfl:     triamterene-hydroCHLOROthiazide (MAXZIDE) 37.5-25 mg per tablet, Take 1 Tablet by mouth daily. , Disp: , Rfl:     hydrALAZINE (APRESOLINE) 25 mg tablet, Take 50 mg by mouth nightly., Disp: , Rfl:     dilTIAZem ER (DILACOR XR) 120 mg capsule, Take 360 mg by mouth daily. , Disp: , Rfl:     OTHER, Take 1 Tablet by mouth daily.  Leternano Zeaxanthin + Saw Palmetto, Disp: , Rfl:    amLODIPine (Norvasc) 10 mg tablet, Take 10 mg by mouth daily. , Disp: , Rfl:     rivaroxaban (Xarelto) 20 mg tab tablet, Take 20 mg by mouth daily. , Disp: , Rfl:      Past Medical History:   Diagnosis Date    Anticoagulated     Xarelto    COVID-19 vaccine series completed 04/15/2021    Moderna    Diverticulosis     HTN (hypertension)     Hyperlipemia     Hypothyroidism     Osteoarthritis     Paroxysmal A-fib (HCC)     Poor historian      Past Surgical History:   Procedure Laterality Date    HX ANKLE FRACTURE TX Left 2000    HX LUMBAR FUSION      HX ORTHOPAEDIC Left 06/30/2021    Removal of hardware     Social History     Tobacco Use    Smoking status: Never Smoker    Smokeless tobacco: Never Used   Substance Use Topics    Alcohol use: Never    Drug use: Never     Family History   Problem Relation Age of Onset    Anesth Problems Neg Hx     Clotting Disorder Neg Hx        Objective     Vitals:    10/20/21 1307   BP: (!) 142/74   Pulse: 89   Resp: 18   Temp: 98.4 °F (36.9 °C)   SpO2: 94%   Weight: 123 kg (271 lb 3.2 oz)   Height: 6' 1\" (1.854 m)       General Appearance: Alert, Well Appearing and in no distress  Mental Status: Normal mood, behavior, speech and dress  Neck: Normal appearance externally  Ears: External canal no drainage  Nose: Normal external appearance and no drainage   Chest: Clear to auscultation, no wheezes, rales or rhonchi  Heart: Normal rate, regular rhythm, no murmurs, rubs, clicks or gallops  Skin: Normal color, no lesions externally  Abdomen: Not examined  Neuro: Not examined  Musculoskeletal: Gait antalgic    Recent Results (from the past 168 hour(s))   CBC WITH AUTOMATED DIFF    Collection Time: 10/20/21  1:43 PM   Result Value Ref Range    WBC 8.6 4.1 - 11.1 K/uL    RBC 4.74 4.10 - 5.70 M/uL    HGB 14.3 12.1 - 17.0 g/dL    HCT 43.4 36.6 - 50.3 %    MCV 91.6 80.0 - 99.0 FL    MCH 30.2 26.0 - 34.0 PG    MCHC 32.9 30.0 - 36.5 g/dL    RDW 14.0 11.5 - 14.5 %    PLATELET 333 288 - 400 K/uL    MPV 9.2 8.9 - 12.9 FL    NRBC 0.0 0  WBC    ABSOLUTE NRBC 0.00 0.00 - 0.01 K/uL    NEUTROPHILS 72 32 - 75 %    LYMPHOCYTES 15 12 - 49 %    MONOCYTES 9 5 - 13 %    EOSINOPHILS 2 0 - 7 %    BASOPHILS 1 0 - 1 %    IMMATURE GRANULOCYTES 1 (H) 0.0 - 0.5 %    ABS. NEUTROPHILS 6.3 1.8 - 8.0 K/UL    ABS. LYMPHOCYTES 1.2 0.8 - 3.5 K/UL    ABS. MONOCYTES 0.8 0.0 - 1.0 K/UL    ABS. EOSINOPHILS 0.1 0.0 - 0.4 K/UL    ABS. BASOPHILS 0.1 0.0 - 0.1 K/UL    ABS. IMM. GRANS. 0.1 (H) 0.00 - 0.04 K/UL    DF AUTOMATED     METABOLIC PANEL, COMPREHENSIVE    Collection Time: 10/20/21  1:43 PM   Result Value Ref Range    Sodium 137 136 - 145 mmol/L    Potassium 3.7 3.5 - 5.1 mmol/L    Chloride 101 97 - 108 mmol/L    CO2 31 21 - 32 mmol/L    Anion gap 5 5 - 15 mmol/L    Glucose 80 65 - 100 mg/dL    BUN 15 6 - 20 MG/DL    Creatinine 0.82 0.70 - 1.30 MG/DL    BUN/Creatinine ratio 18 12 - 20      GFR est AA >60 >60 ml/min/1.73m2    GFR est non-AA >60 >60 ml/min/1.73m2    Calcium 8.8 8.5 - 10.1 MG/DL    Bilirubin, total 0.6 0.2 - 1.0 MG/DL    ALT (SGPT) 22 12 - 78 U/L    AST (SGOT) 14 (L) 15 - 37 U/L    Alk. phosphatase 91 45 - 117 U/L    Protein, total 6.7 6.4 - 8.2 g/dL    Albumin 3.6 3.5 - 5.0 g/dL    Globulin 3.1 2.0 - 4.0 g/dL    A-G Ratio 1.2 1.1 - 2.2     HEMOGLOBIN A1C WITH EAG    Collection Time: 10/20/21  1:43 PM   Result Value Ref Range    Hemoglobin A1c 5.5 4.0 - 5.6 %    Est. average glucose 111 mg/dL   CULTURE, MRSA    Collection Time: 10/20/21  1:43 PM    Specimen: Nares; Nasal   Result Value Ref Range    Special Requests: NO SPECIAL REQUESTS      Culture result: MRSA NOT PRESENT      Culture result:        Screening of patient nares for MRSA is for surveillance purposes and, if positive, to facilitate isolation considerations in high risk settings. It is not intended for automatic decolonization interventions per se as regimens are not sufficiently effective to warrant routine use.    URINALYSIS W/ REFLEX CULTURE    Collection Time: 10/20/21  1:43 PM    Specimen: Urine   Result Value Ref Range    Color YELLOW/STRAW      Appearance CLEAR CLEAR      Specific gravity 1.014 1.003 - 1.030      pH (UA) 8.0 5.0 - 8.0      Protein Negative NEG mg/dL    Glucose Negative NEG mg/dL    Ketone Negative NEG mg/dL    Bilirubin Negative NEG      Blood Negative NEG      Urobilinogen 0.2 0.2 - 1.0 EU/dL    Nitrites Negative NEG      Leukocyte Esterase Negative NEG      WBC 0-4 0 - 4 /hpf    RBC 0-5 0 - 5 /hpf    Epithelial cells FEW FEW /lpf    Bacteria Negative NEG /hpf    UA:UC IF INDICATED CULTURE NOT INDICATED BY UA RESULT CNI         Assessment and Plan     Assessment/Plan:   1) OA Left Knee       Pre-Operative Evaluation    Plan:  LTK  Labs and EKG reviewed. MRSA negative      Preoperative Risk Stratification:    Per RCRI, the patient has a 0.4% risk of cardiac death, nonfatal MI, nonfatal cardiac arrest based on no risk factors. Per ACC/AHA guidelines, patient is low risk for a(n) intermediate risk surgery and may proceed to planned surgery with the above noted risk.     Destin Mijares NP

## 2021-10-20 NOTE — PERIOP NOTES
Pt instructed by NP to obtain  pulmonology note for clearance for surgery. Office fax number provided to pt along with office phone number.

## 2021-10-21 LAB
BACTERIA SPEC CULT: NORMAL
BACTERIA SPEC CULT: NORMAL
SERVICE CMNT-IMP: NORMAL

## 2021-10-28 ENCOUNTER — HOSPITAL ENCOUNTER (OUTPATIENT)
Dept: PREADMISSION TESTING | Age: 76
Discharge: HOME | End: 2021-10-28
Payer: COMMERCIAL

## 2021-10-28 PROCEDURE — U0003 INFECTIOUS AGENT DETECTION BY NUCLEIC ACID (DNA OR RNA); SEVERE ACUTE RESPIRATORY SYNDROME CORONAVIRUS 2 (SARS-COV-2) (CORONAVIRUS DISEASE [COVID-19]), AMPLIFIED PROBE TECHNIQUE, MAKING USE OF HIGH THROUGHPUT TECHNOLOGIES AS DESCRIBED BY CMS-2020-01-R: HCPCS

## 2021-10-29 LAB
SARS-COV-2, XPLCVT: NOT DETECTED
SOURCE, COVRS: NORMAL

## 2021-11-01 ENCOUNTER — ANESTHESIA EVENT (OUTPATIENT)
Dept: SURGERY | Age: 76
End: 2021-11-01
Payer: COMMERCIAL

## 2021-11-02 ENCOUNTER — ANESTHESIA (OUTPATIENT)
Dept: SURGERY | Age: 76
End: 2021-11-02
Payer: COMMERCIAL

## 2021-11-02 ENCOUNTER — HOSPITAL ENCOUNTER (OUTPATIENT)
Age: 76
Setting detail: OBSERVATION
Discharge: HOME HEALTH | End: 2021-11-03
Attending: ORTHOPAEDIC SURGERY | Admitting: ORTHOPAEDIC SURGERY
Payer: COMMERCIAL

## 2021-11-02 ENCOUNTER — APPOINTMENT (OUTPATIENT)
Dept: GENERAL RADIOLOGY | Age: 76
End: 2021-11-02
Attending: ORTHOPAEDIC SURGERY
Payer: COMMERCIAL

## 2021-11-02 DIAGNOSIS — M17.12 PRIMARY OSTEOARTHRITIS OF LEFT KNEE: Primary | ICD-10-CM

## 2021-11-02 PROCEDURE — 77030042407 HC SUT FBRWRE -B: Performed by: ORTHOPAEDIC SURGERY

## 2021-11-02 PROCEDURE — 77030000032 HC CUF TRNQT ZIMM -B: Performed by: ORTHOPAEDIC SURGERY

## 2021-11-02 PROCEDURE — 74011000250 HC RX REV CODE- 250: Performed by: NURSE ANESTHETIST, CERTIFIED REGISTERED

## 2021-11-02 PROCEDURE — 74011000258 HC RX REV CODE- 258: Performed by: ORTHOPAEDIC SURGERY

## 2021-11-02 PROCEDURE — 74011250636 HC RX REV CODE- 250/636: Performed by: ORTHOPAEDIC SURGERY

## 2021-11-02 PROCEDURE — 77030040361 HC SLV COMPR DVT MDII -B

## 2021-11-02 PROCEDURE — 77030040922 HC BLNKT HYPOTHRM STRY -A

## 2021-11-02 PROCEDURE — 77030033067 HC SUT PDO STRATFX SPIR J&J -B: Performed by: ORTHOPAEDIC SURGERY

## 2021-11-02 PROCEDURE — 74011250636 HC RX REV CODE- 250/636: Performed by: NURSE ANESTHETIST, CERTIFIED REGISTERED

## 2021-11-02 PROCEDURE — 77030028907 HC WRP KNEE WO BGS SOLM -B

## 2021-11-02 PROCEDURE — 76030000020 HC AMB SURG 1.5 TO 2 HR INTENSV-TIER 1: Performed by: ORTHOPAEDIC SURGERY

## 2021-11-02 PROCEDURE — 74011250636 HC RX REV CODE- 250/636: Performed by: ANESTHESIOLOGY

## 2021-11-02 PROCEDURE — 77030003601 HC NDL NRV BLK BBMI -A

## 2021-11-02 PROCEDURE — 74011000250 HC RX REV CODE- 250: Performed by: ORTHOPAEDIC SURGERY

## 2021-11-02 PROCEDURE — C1776 JOINT DEVICE (IMPLANTABLE): HCPCS | Performed by: ORTHOPAEDIC SURGERY

## 2021-11-02 PROCEDURE — 77030040750 HC INSTR NAV SYS DISP ORLN -G: Performed by: ORTHOPAEDIC SURGERY

## 2021-11-02 PROCEDURE — G0378 HOSPITAL OBSERVATION PER HR: HCPCS

## 2021-11-02 PROCEDURE — 99218 HC RM OBSERVATION: CPT

## 2021-11-02 PROCEDURE — 77030027138 HC INCENT SPIROMETER -A

## 2021-11-02 PROCEDURE — 74011250637 HC RX REV CODE- 250/637: Performed by: ANESTHESIOLOGY

## 2021-11-02 PROCEDURE — 77030006835 HC BLD SAW SAG STRY -B: Performed by: ORTHOPAEDIC SURGERY

## 2021-11-02 PROCEDURE — 76060000063 HC AMB SURG ANES 1.5 TO 2 HR: Performed by: ORTHOPAEDIC SURGERY

## 2021-11-02 PROCEDURE — C1713 ANCHOR/SCREW BN/BN,TIS/BN: HCPCS | Performed by: ORTHOPAEDIC SURGERY

## 2021-11-02 PROCEDURE — 77030040393 HC DRSG OPTIFOAM GENT MDII -B: Performed by: ORTHOPAEDIC SURGERY

## 2021-11-02 PROCEDURE — 77030038692 HC WND DEB SYS IRMX -B: Performed by: ORTHOPAEDIC SURGERY

## 2021-11-02 PROCEDURE — 64447 NJX AA&/STRD FEMORAL NRV IMG: CPT

## 2021-11-02 PROCEDURE — 77030031139 HC SUT VCRL2 J&J -A: Performed by: ORTHOPAEDIC SURGERY

## 2021-11-02 PROCEDURE — 76210000037 HC AMBSU PH I REC 2 TO 2.5 HR: Performed by: ORTHOPAEDIC SURGERY

## 2021-11-02 PROCEDURE — 74011250637 HC RX REV CODE- 250/637: Performed by: ORTHOPAEDIC SURGERY

## 2021-11-02 PROCEDURE — 73560 X-RAY EXAM OF KNEE 1 OR 2: CPT

## 2021-11-02 PROCEDURE — 2709999900 HC NON-CHARGEABLE SUPPLY: Performed by: ORTHOPAEDIC SURGERY

## 2021-11-02 DEVICE — PALACOS® FASTR+G IS AN ACRYLIC BONE CEMENT FOR USE IN ORTHOPEDIC SURGERY. IT IS FORMED FROM POWDER AND LIQUID BY EXOTHERMIC POLYMERIZATION. IT SECURES THE FIXATION OF THE GRAFTED ARTIFICIAL JOINT IMPROVING THE TRANSFER OF FORCES AT THE INTERFACE IMPLANT - BONE. THE ANTIBIOTIC GENTAMICIN SULPHATE HAS BEEN ADDED TO PROTECT THE CURED CEMENT AND CONTIGUOUS TISSUE AGAINST CONTAMINATION BY MICROBES SENSITIVE TO GENTAMICIN. PALACOS® FASTR+G IS AVAILABLE IN 51 G PACKAGING SIZE.PALACOS® FAST R+G IS INDICATED FOR USE AS BONE CEMENT IN ARTHROPLASTY PROCEDURES OF THE HIP, KNEE AND OTHER JOINTS TO FIX PLASTIC AND METAL PROSTHETIC PARTS TO LIVING BONE WHEN RECONSTRUCTION IS NECESSARY. THE CEMENT IS INDICATED FOR USE IN THE SECOND STAGE OF A TWO STAGE REVISION FOR TOTAL JOINT ARTHROPLASTY AFTER THE INITIAL INFECTION HAS BEEN CLEARED.
Type: IMPLANTABLE DEVICE | Site: KNEE | Status: FUNCTIONAL
Brand: PALACOS®

## 2021-11-02 DEVICE — DJO EMPOWR KNEETM, PRESS FIT BP, 9L
Type: IMPLANTABLE DEVICE | Site: KNEE | Status: FUNCTIONAL
Brand: DJO SURGICAL

## 2021-11-02 DEVICE — DOMED TRI-PEG PATELLA, 38X9MM, E-PLUS
Type: IMPLANTABLE DEVICE | Site: KNEE | Status: FUNCTIONAL
Brand: DJO SURGICAL

## 2021-11-02 DEVICE — EMPOWR 3D KNEETM INS, 9L 10MM, VE
Type: IMPLANTABLE DEVICE | Site: KNEE | Status: FUNCTIONAL
Brand: DJO SURGICAL

## 2021-11-02 DEVICE — EMPOWR 3D KNEETM, PRESS FIT FEMUR, 9L
Type: IMPLANTABLE DEVICE | Site: KNEE | Status: FUNCTIONAL
Brand: DJO SURGICAL

## 2021-11-02 RX ORDER — OXYCODONE HCL 10 MG/1
10 TABLET, FILM COATED, EXTENDED RELEASE ORAL ONCE
Status: COMPLETED | OUTPATIENT
Start: 2021-11-02 | End: 2021-11-02

## 2021-11-02 RX ORDER — OLMESARTAN MEDOXOMIL 40 MG/1
40 TABLET ORAL DAILY
Status: DISCONTINUED | OUTPATIENT
Start: 2021-11-03 | End: 2021-11-03

## 2021-11-02 RX ORDER — CELECOXIB 100 MG/1
100 CAPSULE ORAL ONCE
Status: COMPLETED | OUTPATIENT
Start: 2021-11-02 | End: 2021-11-02

## 2021-11-02 RX ORDER — SODIUM CHLORIDE, SODIUM LACTATE, POTASSIUM CHLORIDE, CALCIUM CHLORIDE 600; 310; 30; 20 MG/100ML; MG/100ML; MG/100ML; MG/100ML
125 INJECTION, SOLUTION INTRAVENOUS CONTINUOUS
Status: DISCONTINUED | OUTPATIENT
Start: 2021-11-02 | End: 2021-11-02 | Stop reason: HOSPADM

## 2021-11-02 RX ORDER — ONDANSETRON 2 MG/ML
INJECTION INTRAMUSCULAR; INTRAVENOUS AS NEEDED
Status: DISCONTINUED | OUTPATIENT
Start: 2021-11-02 | End: 2021-11-02 | Stop reason: HOSPADM

## 2021-11-02 RX ORDER — OXYCODONE HYDROCHLORIDE 5 MG/1
5 TABLET ORAL
Status: DISCONTINUED | OUTPATIENT
Start: 2021-11-02 | End: 2021-11-03 | Stop reason: HOSPADM

## 2021-11-02 RX ORDER — ROCURONIUM BROMIDE 10 MG/ML
INJECTION, SOLUTION INTRAVENOUS AS NEEDED
Status: DISCONTINUED | OUTPATIENT
Start: 2021-11-02 | End: 2021-11-02 | Stop reason: HOSPADM

## 2021-11-02 RX ORDER — DIPHENHYDRAMINE HYDROCHLORIDE 50 MG/ML
12.5 INJECTION, SOLUTION INTRAMUSCULAR; INTRAVENOUS
Status: DISCONTINUED | OUTPATIENT
Start: 2021-11-02 | End: 2021-11-03 | Stop reason: HOSPADM

## 2021-11-02 RX ORDER — ONDANSETRON 2 MG/ML
4 INJECTION INTRAMUSCULAR; INTRAVENOUS
Status: DISCONTINUED | OUTPATIENT
Start: 2021-11-02 | End: 2021-11-03 | Stop reason: HOSPADM

## 2021-11-02 RX ORDER — HYDRALAZINE HYDROCHLORIDE 25 MG/1
50 TABLET, FILM COATED ORAL
Status: DISCONTINUED | OUTPATIENT
Start: 2021-11-02 | End: 2021-11-03 | Stop reason: HOSPADM

## 2021-11-02 RX ORDER — SUCCINYLCHOLINE CHLORIDE 20 MG/ML
INJECTION INTRAMUSCULAR; INTRAVENOUS AS NEEDED
Status: DISCONTINUED | OUTPATIENT
Start: 2021-11-02 | End: 2021-11-02 | Stop reason: HOSPADM

## 2021-11-02 RX ORDER — FACIAL-BODY WIPES
10 EACH TOPICAL DAILY PRN
Status: DISCONTINUED | OUTPATIENT
Start: 2021-11-04 | End: 2021-11-03 | Stop reason: HOSPADM

## 2021-11-02 RX ORDER — ALPRAZOLAM 0.5 MG/1
0.25 TABLET ORAL
Status: DISCONTINUED | OUTPATIENT
Start: 2021-11-02 | End: 2021-11-03 | Stop reason: HOSPADM

## 2021-11-02 RX ORDER — NALOXONE HYDROCHLORIDE 0.4 MG/ML
0.4 INJECTION, SOLUTION INTRAMUSCULAR; INTRAVENOUS; SUBCUTANEOUS AS NEEDED
Status: DISCONTINUED | OUTPATIENT
Start: 2021-11-02 | End: 2021-11-03 | Stop reason: HOSPADM

## 2021-11-02 RX ORDER — PROPOFOL 10 MG/ML
INJECTION, EMULSION INTRAVENOUS AS NEEDED
Status: DISCONTINUED | OUTPATIENT
Start: 2021-11-02 | End: 2021-11-02 | Stop reason: HOSPADM

## 2021-11-02 RX ORDER — DIPHENHYDRAMINE HYDROCHLORIDE 50 MG/ML
12.5 INJECTION, SOLUTION INTRAMUSCULAR; INTRAVENOUS AS NEEDED
Status: DISCONTINUED | OUTPATIENT
Start: 2021-11-02 | End: 2021-11-02 | Stop reason: HOSPADM

## 2021-11-02 RX ORDER — LIDOCAINE HYDROCHLORIDE 20 MG/ML
INJECTION, SOLUTION EPIDURAL; INFILTRATION; INTRACAUDAL; PERINEURAL AS NEEDED
Status: DISCONTINUED | OUTPATIENT
Start: 2021-11-02 | End: 2021-11-02 | Stop reason: HOSPADM

## 2021-11-02 RX ORDER — SODIUM CHLORIDE 0.9 % (FLUSH) 0.9 %
5-40 SYRINGE (ML) INJECTION EVERY 8 HOURS
Status: DISCONTINUED | OUTPATIENT
Start: 2021-11-02 | End: 2021-11-03 | Stop reason: HOSPADM

## 2021-11-02 RX ORDER — EPHEDRINE SULFATE/0.9% NACL/PF 50 MG/5 ML
SYRINGE (ML) INTRAVENOUS AS NEEDED
Status: DISCONTINUED | OUTPATIENT
Start: 2021-11-02 | End: 2021-11-02 | Stop reason: HOSPADM

## 2021-11-02 RX ORDER — ROPIVACAINE HYDROCHLORIDE 5 MG/ML
INJECTION, SOLUTION EPIDURAL; INFILTRATION; PERINEURAL AS NEEDED
Status: DISCONTINUED | OUTPATIENT
Start: 2021-11-02 | End: 2021-11-02 | Stop reason: HOSPADM

## 2021-11-02 RX ORDER — AMLODIPINE BESYLATE 5 MG/1
10 TABLET ORAL DAILY
Status: DISCONTINUED | OUTPATIENT
Start: 2021-11-03 | End: 2021-11-03 | Stop reason: HOSPADM

## 2021-11-02 RX ORDER — NEOSTIGMINE METHYLSULFATE 1 MG/ML
INJECTION, SOLUTION INTRAVENOUS AS NEEDED
Status: DISCONTINUED | OUTPATIENT
Start: 2021-11-02 | End: 2021-11-02 | Stop reason: HOSPADM

## 2021-11-02 RX ORDER — OXYCODONE HYDROCHLORIDE 5 MG/1
5 TABLET ORAL
Status: DISCONTINUED | OUTPATIENT
Start: 2021-11-02 | End: 2021-11-02 | Stop reason: HOSPADM

## 2021-11-02 RX ORDER — HYDROMORPHONE HYDROCHLORIDE 1 MG/ML
0.5 INJECTION, SOLUTION INTRAMUSCULAR; INTRAVENOUS; SUBCUTANEOUS
Status: DISCONTINUED | OUTPATIENT
Start: 2021-11-02 | End: 2021-11-03 | Stop reason: HOSPADM

## 2021-11-02 RX ORDER — MIDAZOLAM HYDROCHLORIDE 1 MG/ML
INJECTION, SOLUTION INTRAMUSCULAR; INTRAVENOUS AS NEEDED
Status: DISCONTINUED | OUTPATIENT
Start: 2021-11-02 | End: 2021-11-02 | Stop reason: HOSPADM

## 2021-11-02 RX ORDER — AMOXICILLIN 250 MG
1 CAPSULE ORAL 2 TIMES DAILY
Status: DISCONTINUED | OUTPATIENT
Start: 2021-11-02 | End: 2021-11-03 | Stop reason: HOSPADM

## 2021-11-02 RX ORDER — SODIUM CHLORIDE 9 MG/ML
125 INJECTION, SOLUTION INTRAVENOUS CONTINUOUS
Status: DISCONTINUED | OUTPATIENT
Start: 2021-11-02 | End: 2021-11-03 | Stop reason: HOSPADM

## 2021-11-02 RX ORDER — ONDANSETRON 2 MG/ML
4 INJECTION INTRAMUSCULAR; INTRAVENOUS AS NEEDED
Status: DISCONTINUED | OUTPATIENT
Start: 2021-11-02 | End: 2021-11-02 | Stop reason: HOSPADM

## 2021-11-02 RX ORDER — SODIUM CHLORIDE, SODIUM LACTATE, POTASSIUM CHLORIDE, CALCIUM CHLORIDE 600; 310; 30; 20 MG/100ML; MG/100ML; MG/100ML; MG/100ML
150 INJECTION, SOLUTION INTRAVENOUS CONTINUOUS
Status: DISCONTINUED | OUTPATIENT
Start: 2021-11-02 | End: 2021-11-02 | Stop reason: HOSPADM

## 2021-11-02 RX ORDER — ACETAMINOPHEN 500 MG
1000 TABLET ORAL EVERY 6 HOURS
Status: DISCONTINUED | OUTPATIENT
Start: 2021-11-02 | End: 2021-11-03 | Stop reason: HOSPADM

## 2021-11-02 RX ORDER — TRIAMTERENE/HYDROCHLOROTHIAZID 37.5-25 MG
1 TABLET ORAL DAILY
Status: DISCONTINUED | OUTPATIENT
Start: 2021-11-03 | End: 2021-11-03 | Stop reason: HOSPADM

## 2021-11-02 RX ORDER — ALBUTEROL SULFATE 0.83 MG/ML
2.5 SOLUTION RESPIRATORY (INHALATION) AS NEEDED
Status: DISCONTINUED | OUTPATIENT
Start: 2021-11-02 | End: 2021-11-02 | Stop reason: HOSPADM

## 2021-11-02 RX ORDER — OXYCODONE HYDROCHLORIDE 5 MG/1
10 TABLET ORAL
Status: DISCONTINUED | OUTPATIENT
Start: 2021-11-02 | End: 2021-11-03 | Stop reason: HOSPADM

## 2021-11-02 RX ORDER — ACETAMINOPHEN 325 MG/1
975 TABLET ORAL ONCE
Status: COMPLETED | OUTPATIENT
Start: 2021-11-02 | End: 2021-11-02

## 2021-11-02 RX ORDER — ONDANSETRON 4 MG/1
4 TABLET, ORALLY DISINTEGRATING ORAL
Status: DISCONTINUED | OUTPATIENT
Start: 2021-11-02 | End: 2021-11-03 | Stop reason: HOSPADM

## 2021-11-02 RX ORDER — PHENYLEPHRINE HCL IN 0.9% NACL 0.4MG/10ML
SYRINGE (ML) INTRAVENOUS AS NEEDED
Status: DISCONTINUED | OUTPATIENT
Start: 2021-11-02 | End: 2021-11-02 | Stop reason: HOSPADM

## 2021-11-02 RX ORDER — KETOROLAC TROMETHAMINE 30 MG/ML
15 INJECTION, SOLUTION INTRAMUSCULAR; INTRAVENOUS
Status: COMPLETED | OUTPATIENT
Start: 2021-11-02 | End: 2021-11-02

## 2021-11-02 RX ORDER — POLYETHYLENE GLYCOL 3350 17 G/17G
17 POWDER, FOR SOLUTION ORAL DAILY
Status: DISCONTINUED | OUTPATIENT
Start: 2021-11-03 | End: 2021-11-03 | Stop reason: HOSPADM

## 2021-11-02 RX ORDER — PANTOPRAZOLE SODIUM 40 MG/1
40 TABLET, DELAYED RELEASE ORAL
Status: DISCONTINUED | OUTPATIENT
Start: 2021-11-03 | End: 2021-11-03 | Stop reason: HOSPADM

## 2021-11-02 RX ORDER — DILTIAZEM HYDROCHLORIDE 180 MG/1
360 CAPSULE, COATED, EXTENDED RELEASE ORAL DAILY
Status: DISCONTINUED | OUTPATIENT
Start: 2021-11-03 | End: 2021-11-03 | Stop reason: HOSPADM

## 2021-11-02 RX ORDER — FENTANYL CITRATE 50 UG/ML
INJECTION, SOLUTION INTRAMUSCULAR; INTRAVENOUS AS NEEDED
Status: DISCONTINUED | OUTPATIENT
Start: 2021-11-02 | End: 2021-11-02 | Stop reason: HOSPADM

## 2021-11-02 RX ORDER — SODIUM CHLORIDE 0.9 % (FLUSH) 0.9 %
5-40 SYRINGE (ML) INJECTION AS NEEDED
Status: DISCONTINUED | OUTPATIENT
Start: 2021-11-02 | End: 2021-11-03 | Stop reason: HOSPADM

## 2021-11-02 RX ORDER — HYDROMORPHONE HYDROCHLORIDE 1 MG/ML
0.5 INJECTION, SOLUTION INTRAMUSCULAR; INTRAVENOUS; SUBCUTANEOUS
Status: DISCONTINUED | OUTPATIENT
Start: 2021-11-02 | End: 2021-11-02 | Stop reason: HOSPADM

## 2021-11-02 RX ORDER — GLYCOPYRROLATE 0.2 MG/ML
INJECTION INTRAMUSCULAR; INTRAVENOUS AS NEEDED
Status: DISCONTINUED | OUTPATIENT
Start: 2021-11-02 | End: 2021-11-02 | Stop reason: HOSPADM

## 2021-11-02 RX ORDER — LEVOTHYROXINE SODIUM 150 UG/1
150 TABLET ORAL EVERY OTHER DAY
Status: DISCONTINUED | OUTPATIENT
Start: 2021-11-03 | End: 2021-11-03 | Stop reason: HOSPADM

## 2021-11-02 RX ORDER — LIDOCAINE HYDROCHLORIDE 10 MG/ML
0.1 INJECTION, SOLUTION EPIDURAL; INFILTRATION; INTRACAUDAL; PERINEURAL AS NEEDED
Status: DISCONTINUED | OUTPATIENT
Start: 2021-11-02 | End: 2021-11-02 | Stop reason: HOSPADM

## 2021-11-02 RX ADMIN — Medication 10 ML: at 23:37

## 2021-11-02 RX ADMIN — ACETAMINOPHEN 1000 MG: 500 TABLET ORAL at 20:40

## 2021-11-02 RX ADMIN — Medication 10 MG: at 15:47

## 2021-11-02 RX ADMIN — GLYCOPYRROLATE 0.4 MG: 0.2 INJECTION INTRAMUSCULAR; INTRAVENOUS at 15:58

## 2021-11-02 RX ADMIN — ONDANSETRON HYDROCHLORIDE 4 MG: 2 SOLUTION INTRAMUSCULAR; INTRAVENOUS at 15:55

## 2021-11-02 RX ADMIN — HYDROMORPHONE HYDROCHLORIDE 0.5 MG: 1 INJECTION, SOLUTION INTRAMUSCULAR; INTRAVENOUS; SUBCUTANEOUS at 16:39

## 2021-11-02 RX ADMIN — SODIUM CHLORIDE 125 ML/HR: 9 INJECTION, SOLUTION INTRAVENOUS at 16:44

## 2021-11-02 RX ADMIN — ROCURONIUM BROMIDE 25 MG: 10 INJECTION INTRAVENOUS at 14:49

## 2021-11-02 RX ADMIN — ACETAMINOPHEN 975 MG: 325 TABLET ORAL at 12:00

## 2021-11-02 RX ADMIN — ROPIVACAINE HYDROCHLORIDE 30 ML: 5 INJECTION, SOLUTION EPIDURAL; INFILTRATION; PERINEURAL at 13:59

## 2021-11-02 RX ADMIN — CELECOXIB 100 MG: 100 CAPSULE ORAL at 12:00

## 2021-11-02 RX ADMIN — Medication 100 MCG: at 15:13

## 2021-11-02 RX ADMIN — Medication 100 MCG: at 15:24

## 2021-11-02 RX ADMIN — LIDOCAINE HYDROCHLORIDE 80 MG: 20 INJECTION, SOLUTION INTRAVENOUS at 14:44

## 2021-11-02 RX ADMIN — ROCURONIUM BROMIDE 5 MG: 10 INJECTION INTRAVENOUS at 14:44

## 2021-11-02 RX ADMIN — FENTANYL CITRATE 100 MCG: 50 INJECTION, SOLUTION INTRAMUSCULAR; INTRAVENOUS at 14:41

## 2021-11-02 RX ADMIN — HYDRALAZINE HYDROCHLORIDE 50 MG: 25 TABLET, FILM COATED ORAL at 20:40

## 2021-11-02 RX ADMIN — Medication 20 MG: at 16:04

## 2021-11-02 RX ADMIN — MIDAZOLAM 2 MG: 1 INJECTION, SOLUTION INTRAMUSCULAR; INTRAVENOUS at 13:52

## 2021-11-02 RX ADMIN — HYDROMORPHONE HYDROCHLORIDE 0.5 MG: 1 INJECTION, SOLUTION INTRAMUSCULAR; INTRAVENOUS; SUBCUTANEOUS at 17:51

## 2021-11-02 RX ADMIN — Medication 4 MG: at 15:59

## 2021-11-02 RX ADMIN — FENTANYL CITRATE 50 MCG: 50 INJECTION, SOLUTION INTRAMUSCULAR; INTRAVENOUS at 13:59

## 2021-11-02 RX ADMIN — OXYCODONE 5 MG: 5 TABLET ORAL at 23:37

## 2021-11-02 RX ADMIN — OXYCODONE 10 MG: 5 TABLET ORAL at 18:52

## 2021-11-02 RX ADMIN — CEFAZOLIN 2 G: 1 INJECTION, POWDER, FOR SOLUTION INTRAMUSCULAR; INTRAVENOUS at 20:40

## 2021-11-02 RX ADMIN — FENTANYL CITRATE 50 MCG: 50 INJECTION, SOLUTION INTRAMUSCULAR; INTRAVENOUS at 13:52

## 2021-11-02 RX ADMIN — FENTANYL CITRATE 50 MCG: 50 INJECTION, SOLUTION INTRAMUSCULAR; INTRAVENOUS at 16:29

## 2021-11-02 RX ADMIN — DOCUSATE SODIUM 50MG AND SENNOSIDES 8.6MG 1 TABLET: 8.6; 5 TABLET, FILM COATED ORAL at 18:53

## 2021-11-02 RX ADMIN — HYDROMORPHONE HYDROCHLORIDE 0.5 MG: 1 INJECTION, SOLUTION INTRAMUSCULAR; INTRAVENOUS; SUBCUTANEOUS at 17:07

## 2021-11-02 RX ADMIN — SUCCINYLCHOLINE CHLORIDE 120 MG: 20 INJECTION, SOLUTION INTRAMUSCULAR; INTRAVENOUS at 14:45

## 2021-11-02 RX ADMIN — OXYCODONE HYDROCHLORIDE 10 MG: 10 TABLET, FILM COATED, EXTENDED RELEASE ORAL at 12:00

## 2021-11-02 RX ADMIN — HYDROMORPHONE HYDROCHLORIDE 0.5 MG: 1 INJECTION, SOLUTION INTRAMUSCULAR; INTRAVENOUS; SUBCUTANEOUS at 16:49

## 2021-11-02 RX ADMIN — HYDROMORPHONE HYDROCHLORIDE 0.5 MG: 1 INJECTION, SOLUTION INTRAMUSCULAR; INTRAVENOUS; SUBCUTANEOUS at 17:34

## 2021-11-02 RX ADMIN — SODIUM CHLORIDE, POTASSIUM CHLORIDE, SODIUM LACTATE AND CALCIUM CHLORIDE 150 ML/HR: 600; 310; 30; 20 INJECTION, SOLUTION INTRAVENOUS at 11:59

## 2021-11-02 RX ADMIN — PROPOFOL 170 MG: 10 INJECTION, EMULSION INTRAVENOUS at 14:44

## 2021-11-02 RX ADMIN — ACETAMINOPHEN 1000 MG: 500 TABLET ORAL at 23:37

## 2021-11-02 RX ADMIN — CEFAZOLIN SODIUM 2 G: 1 POWDER, FOR SOLUTION INTRAMUSCULAR; INTRAVENOUS at 14:50

## 2021-11-02 RX ADMIN — KETOROLAC TROMETHAMINE 15 MG: 30 INJECTION, SOLUTION INTRAMUSCULAR at 17:07

## 2021-11-02 RX ADMIN — Medication 10 MG: at 15:44

## 2021-11-02 RX ADMIN — FENTANYL CITRATE 100 MCG: 50 INJECTION, SOLUTION INTRAMUSCULAR; INTRAVENOUS at 15:03

## 2021-11-02 RX ADMIN — Medication 100 MCG: at 14:59

## 2021-11-02 NOTE — ANESTHESIA PROCEDURE NOTES
Peripheral Block    Start time: 11/2/2021 1:52 PM  End time: 11/2/2021 1:59 PM  Performed by: Frances Oconnor CRNA  Authorized by: Jeanna Howard MD       Pre-procedure: Indications: at surgeon's request and post-op pain management    Preanesthetic Checklist: patient identified, risks and benefits discussed, site marked, timeout performed, anesthesia consent given and patient being monitored    Timeout Time: 13:52 EDT          Block Type:   Block Type:   Adductor canal block  Laterality:  Left  Monitoring:  Continuous pulse ox, frequent vital sign checks, heart rate and responsive to questions  Injection Technique:  Single shot  Procedures: ultrasound guided    Patient Position: supine  Prep: chlorhexidine    Location:  Lower thigh  Needle Type:  Stimuplex  Needle Gauge:  21 G  Needle Localization:  Ultrasound guidance    Assessment:  Number of attempts:  1  Injection Assessment:  Incremental injection every 5 mL, local visualized surrounding nerve on ultrasound, negative aspiration for blood, no paresthesia and no intravascular symptoms  Patient tolerance:  Patient tolerated the procedure well with no immediate complications

## 2021-11-02 NOTE — BRIEF OP NOTE
Brief Postoperative Note    Patient: Tila Hartman  YOB: 1945  MRN: [id number]    Date of Procedure: 11/2/2021     Pre-Op Diagnosis: OA LEFT KNEE    Post-Op Diagnosis: Same as preoperative diagnosis. Procedure(s):  LEFT TOTAL KNEE REPLACEMENT (SPINAL W BLOCK)    Surgeon(s):  Samantha Baumann MD    Surgical Assistant: Surg Asst-1: Megan Valencia    Anesthesia: Spinal     Estimated Blood Loss (mL): Minimal    Complications: None    Specimens: * No specimens in log *     Implants:   Implant Name Type Inv.  Item Serial No.  Lot No. LRB No. Used Action   palacos fast r+g 1x40 bone cement   NA [employer] [id number] Left 1 Implanted   COMPONENT PAT OD38MM THK9MM POLYETH DOMED STYL TRI PEG EPIK - SNA  COMPONENT PAT OD38MM THK9MM POLYETH DOMED STYL TRI PEG EPIK NA [facility] MED[facility] - DJO SURGICAL_WD [id number] Left 1 Implanted       Drains: * No LDAs found *    Findings:  OA LEFT KNEE        Electronically Signed by Joleen Garcia MD on 11/2/2021 at 3:53 PM

## 2021-11-02 NOTE — ANESTHESIA POSTPROCEDURE EVALUATION
Procedure(s):  LEFT TOTAL KNEE REPLACEMENT (SPINAL W BLOCK). spinal    Anesthesia Post Evaluation        Patient location during evaluation: PACU  Level of consciousness: awake  Pain management: adequate  Airway patency: patent  Anesthetic complications: no  Cardiovascular status: acceptable  Respiratory status: acceptable  Hydration status: acceptable  Post anesthesia nausea and vomiting:  none      INITIAL Post-op Vital signs:   Vitals Value Taken Time   /68 11/02/21 1710   Temp 36.6 °C (97.8 °F) 11/02/21 1629   Pulse 89 11/02/21 1713   Resp 22 11/02/21 1713   SpO2 94 % 11/02/21 1713   Vitals shown include unvalidated device data.

## 2021-11-02 NOTE — ANESTHESIA PREPROCEDURE EVALUATION
Relevant Problems   No relevant active problems       Anesthetic History   No history of anesthetic complications            Review of Systems / Medical History  Patient summary reviewed, nursing notes reviewed and pertinent labs reviewed    Pulmonary  Within defined limits                 Neuro/Psych   Within defined limits           Cardiovascular    Hypertension        Dysrhythmias : atrial fibrillation        Comments: On Xarelto,stopped 3 days ago   GI/Hepatic/Renal  Within defined limits              Endo/Other      Hypothyroidism  Arthritis     Other Findings            Physical Exam    Airway  Mallampati: III  TM Distance: 4 - 6 cm  Neck ROM: decreased range of motion   Mouth opening: Normal     Cardiovascular    Rhythm: regular  Rate: normal         Dental    Dentition: Lower dentition intact and Upper dentition intact     Pulmonary  Breath sounds clear to auscultation               Abdominal  GI exam deferred       Other Findings            Anesthetic Plan    ASA: 3  Anesthesia type: spinal      Post-op pain plan if not by surgeon: peripheral nerve block single    Induction: Intravenous  Anesthetic plan and risks discussed with: Patient

## 2021-11-03 VITALS
HEART RATE: 91 BPM | OXYGEN SATURATION: 92 % | SYSTOLIC BLOOD PRESSURE: 142 MMHG | RESPIRATION RATE: 18 BRPM | WEIGHT: 263.67 LBS | TEMPERATURE: 98.2 F | DIASTOLIC BLOOD PRESSURE: 66 MMHG | HEIGHT: 73 IN | BODY MASS INDEX: 34.95 KG/M2

## 2021-11-03 LAB
ANION GAP SERPL CALC-SCNC: 1 MMOL/L (ref 5–15)
BUN SERPL-MCNC: 13 MG/DL (ref 6–20)
BUN/CREAT SERPL: 13 (ref 12–20)
CALCIUM SERPL-MCNC: 8.5 MG/DL (ref 8.5–10.1)
CHLORIDE SERPL-SCNC: 103 MMOL/L (ref 97–108)
CO2 SERPL-SCNC: 32 MMOL/L (ref 21–32)
CREAT SERPL-MCNC: 1.04 MG/DL (ref 0.7–1.3)
GLUCOSE SERPL-MCNC: 93 MG/DL (ref 65–100)
HGB BLD-MCNC: 12 G/DL (ref 12.1–17)
POTASSIUM SERPL-SCNC: 3.2 MMOL/L (ref 3.5–5.1)
SODIUM SERPL-SCNC: 136 MMOL/L (ref 136–145)

## 2021-11-03 PROCEDURE — 97165 OT EVAL LOW COMPLEX 30 MIN: CPT

## 2021-11-03 PROCEDURE — 97116 GAIT TRAINING THERAPY: CPT

## 2021-11-03 PROCEDURE — 2709999900 HC NON-CHARGEABLE SUPPLY

## 2021-11-03 PROCEDURE — 36415 COLL VENOUS BLD VENIPUNCTURE: CPT

## 2021-11-03 PROCEDURE — 85018 HEMOGLOBIN: CPT

## 2021-11-03 PROCEDURE — 80048 BASIC METABOLIC PNL TOTAL CA: CPT

## 2021-11-03 PROCEDURE — 97535 SELF CARE MNGMENT TRAINING: CPT

## 2021-11-03 PROCEDURE — G0378 HOSPITAL OBSERVATION PER HR: HCPCS

## 2021-11-03 PROCEDURE — 74011250636 HC RX REV CODE- 250/636: Performed by: ORTHOPAEDIC SURGERY

## 2021-11-03 PROCEDURE — 74011250637 HC RX REV CODE- 250/637: Performed by: ORTHOPAEDIC SURGERY

## 2021-11-03 PROCEDURE — 97161 PT EVAL LOW COMPLEX 20 MIN: CPT

## 2021-11-03 PROCEDURE — 74011250637 HC RX REV CODE- 250/637: Performed by: NURSE PRACTITIONER

## 2021-11-03 PROCEDURE — 74011000250 HC RX REV CODE- 250: Performed by: ORTHOPAEDIC SURGERY

## 2021-11-03 PROCEDURE — 97110 THERAPEUTIC EXERCISES: CPT

## 2021-11-03 RX ORDER — NALOXONE HYDROCHLORIDE 4 MG/.1ML
SPRAY NASAL
Qty: 1 EACH | Refills: 0 | Status: SHIPPED | OUTPATIENT
Start: 2021-11-03

## 2021-11-03 RX ORDER — OXYCODONE HYDROCHLORIDE 5 MG/1
5-10 TABLET ORAL
Qty: 42 TABLET | Refills: 0 | Status: SHIPPED | OUTPATIENT
Start: 2021-11-03 | End: 2021-11-10

## 2021-11-03 RX ORDER — POTASSIUM CHLORIDE 750 MG/1
40 TABLET, FILM COATED, EXTENDED RELEASE ORAL
Status: COMPLETED | OUTPATIENT
Start: 2021-11-03 | End: 2021-11-03

## 2021-11-03 RX ORDER — VALSARTAN 80 MG/1
160 TABLET ORAL EVERY EVENING
Status: DISCONTINUED | OUTPATIENT
Start: 2021-11-03 | End: 2021-11-03 | Stop reason: HOSPADM

## 2021-11-03 RX ADMIN — OXYCODONE 5 MG: 5 TABLET ORAL at 07:32

## 2021-11-03 RX ADMIN — AMLODIPINE BESYLATE 10 MG: 5 TABLET ORAL at 08:42

## 2021-11-03 RX ADMIN — TRIAMTERENE AND HYDROCHLOROTHIAZIDE 1 TABLET: 37.5; 25 TABLET ORAL at 08:41

## 2021-11-03 RX ADMIN — OXYCODONE 10 MG: 5 TABLET ORAL at 12:52

## 2021-11-03 RX ADMIN — ACETAMINOPHEN 1000 MG: 500 TABLET ORAL at 05:40

## 2021-11-03 RX ADMIN — LEVOTHYROXINE SODIUM 150 MCG: 0.15 TABLET ORAL at 07:31

## 2021-11-03 RX ADMIN — RIVAROXABAN 20 MG: 20 TABLET, FILM COATED ORAL at 08:40

## 2021-11-03 RX ADMIN — DOCUSATE SODIUM 50MG AND SENNOSIDES 8.6MG 1 TABLET: 8.6; 5 TABLET, FILM COATED ORAL at 08:40

## 2021-11-03 RX ADMIN — POTASSIUM CHLORIDE 40 MEQ: 750 TABLET, FILM COATED, EXTENDED RELEASE ORAL at 12:56

## 2021-11-03 RX ADMIN — CEFAZOLIN 2 G: 1 INJECTION, POWDER, FOR SOLUTION INTRAMUSCULAR; INTRAVENOUS at 05:05

## 2021-11-03 RX ADMIN — CEFAZOLIN 2 G: 1 INJECTION, POWDER, FOR SOLUTION INTRAMUSCULAR; INTRAVENOUS at 12:56

## 2021-11-03 RX ADMIN — Medication 10 ML: at 15:16

## 2021-11-03 RX ADMIN — DILTIAZEM HYDROCHLORIDE 360 MG: 180 CAPSULE, COATED, EXTENDED RELEASE ORAL at 08:41

## 2021-11-03 RX ADMIN — ACETAMINOPHEN 1000 MG: 500 TABLET ORAL at 12:56

## 2021-11-03 RX ADMIN — Medication 10 ML: at 05:41

## 2021-11-03 NOTE — PROGRESS NOTES
Orthopaedic Progress Note  Post Op day: 1 Day Post-Op    November 3, 2021 1:57 PM     Patient: Mario Avila MRN: [id number]  SSN: [id number]    YOB: 1945  Age: 78 y.o. Sex: male      Admit date:  2021  Date of Surgery:  2021   Procedures:  Procedure(s):  Left Total Knee Replacement  Admitting Physician:  Chelsey Romo MD   Surgeon:  Kat Carnes) and Role:     * Anesthesia, Case - Primary    Consulting Physician(s): Treatment Team: Attending Provider: Jens Ceballos MD; Utilization Review: Gala Alfonso; Primary Nurse: Sabiha Briseno RN; Care Manager: Rashmi WHALEN    SUBJECTIVE:     Mario Avlia is a 78 y.o. male is 1 Day Post-Op s/p Procedure(s):  Left Total Knee Replacement with an appropriate level of post-operative pain. Patient sitting up in chair on exam. Has cleared therapy and feels ready for discharge home. OBJECTIVE:       Physical Exam:  General: Alert, cooperative, no distress. Respiratory: Respirations unlabored  Neurological:  Neurovascular exam within normal limits. Motor: + DF/PF. Dressing/Wound:  OptiFoam intact with small spot of drainage noted. No significant erythema or swelling.       Vital Signs:      Patient Vitals for the past 8 hrs:   BP Temp Pulse Resp SpO2   21 1132 (!) 142/66 98.2 °F (36.8 °C) 91 18 92 %   21 1042 -- -- 87 -- 94 %   21 1034 129/64 -- -- -- --   21 0943 (!) 175/69 -- 99 -- 93 %   21 0900 (!) 160/77 -- -- -- --   21 0850 (!) 144/72 97.6 °F (36.4 °C) 79 20 95 %                                          Temp (24hrs), Av.7 °F (36.5 °C), Min:97.5 °F (36.4 °C), Max:98.2 °F (36.8 °C)      Labs:        Recent Labs     21  0519   HGB 12.0*     Lab Results   Component Value Date/Time    Sodium 136 2021 05:19 AM    Potassium 3.2 (L) 2021 05:19 AM    Chloride 103 2021 05:19 AM    CO2 32 2021 05:19 AM    Glucose 93 2021 05:19 AM    BUN 13 2021 05:19 AM    Creatinine 1.04 11/03/2021 05:19 AM    Calcium 8.5 11/03/2021 05:19 AM       PT/OT:        Gait  Speed/Ingrid: Pace decreased (<100 feet/min)  Gait Abnormalities: Antalgic, Step to gait, Decreased step clearance (increased trunk flexion)  Ambulation - Level of Assistance: Contact guard assistance, Assist x2  Distance (ft): 80 Feet (ft)  Assistive Device: Gait belt, Walker, rolling  Rail Use: Left   Stairs - Level of Assistance: Minimum assistance, Assist X2  Number of Stairs Trained: 3       Patient mobility  Bed Mobility Training  Rolling:  (pt was received up and remained up)  Supine to Sit: Supervision  Scooting: Supervision  Transfer Training  Sit to Stand: Contact guard assistance, Assist x2  Stand to Sit: Contact guard assistance, Assist x2  Bed to Chair: Contact guard assistance, Assist x2      Gait Training  Assistive Device: Gait belt, Walker, rolling  Ambulation - Level of Assistance: Contact guard assistance, Assist x2  Distance (ft): 80 Feet (ft)  Stairs - Level of Assistance: Minimum assistance, Assist X2  Number of Stairs Trained: 3  Rail Use: Left    Weight Bearing Status  Left Side Weight Bearing: As tolerated        ASSESSMENT / PLAN:   Active Problems:    Primary osteoarthritis of left knee (11/2/2021)         S/P Left Total Knee Replacement Cleared for discharge by PT/OT. DVT Prophylaxis Xarelto, SCDs. Pain Continue current regimen. Discharge Disposition Home with HHPT today. Dr. Nicolás Garay present for evaluation of patient and agrees with above plan of care.      Signed By: Althea Fajardo NP    RN, MSN, FNP-BC  Orthopedic Nurse Practitioner  [facility] [place]

## 2021-11-03 NOTE — PROGRESS NOTES
11/3/2021 2:25 PM [facility] was sent pt's discharge and notified of pt's discharge home today via [employer]. 11/3/2021 12:39 PM Case management consults for home health and rw received. Reason for Admission: Elective- Surgery required      ICD-10-CM ICD-9-CM    1. Primary osteoarthritis of left knee  M17.12 715.16 oxyCODONE IR (ROXICODONE) 5 mg immediate release tablet       Assessment:   [x]In person with pt   Charted address and phone numbers confirmed. Observation notice provided in writing to patient and/or caregiver as well as verbal explanation of the policy. Patients who are in outpatient status also receive the Observation notice. Patient has received notice and or patient representative has received via secure email, fax, or certified mail based on patient representative's preference. RUR: n/a   Risk Level: [x]Low []Moderate []High  Value-based purchasing: [] Yes [x] No  Bundle patient: [] Yes [x] No   Specify:     Advance Directive: No Order. [x] No AD on file. Pt declined   [] AD on file. [] Current AD not on file. Copy requested. [] Requests AD, and referral submitted to [facility]. Healthcare Decision Maker:         Assessment:    Age: 78    Sex: [x] Male []Female     Residency: [x]Private residence []Apartment []Assisted Living []LTC []Other:   Exterior Steps: 3  Interior Steps: 1st floor    Lives With: [x]With spouse []Other family members []Underage children []Alone []Care provider []Other:    Prior functioning:  [x]Independent with ADLs and iADLS []Dependent with ADLs and iADLs []Partial dependence, Specify:     Prior DME required:  [x]None []RW []Cane []Crutches []Bedside commode []CPAP []Home O2 (Liter/Provider: ) []Nebulizer   []Shower Chair []Wheelchair []Hospital Bed []Abelino []Stair lift []Rollator []Other:    RW order sent to [facility] and RW delivered to pt.      DME available: [x]None []RW []Cane []Crutches []Bedside commode []CPAP []Home O2 (Liter/Provider: ) []Nebulizer   []Shower Chair []Wheelchair []Hospital Bed []Abelino []Stair lift []Rollator []Other:    Rehab history: []None [x]Outpatient PT []Home Health (Provider/Date: ) []SNF (Provider/Date: ) []IPR (Provider/Date: ) []LTC (Provider/Date: ) []Hospice (Provider/Date: )  []Other:     Discharge Concerns: []Yes [x]No []Unknown   Describe:    Comments: Insurer:   Insurance Information                [employer]/ [address] Phone: --    Subscriber: Alexroz Mj Subscriber#: [id number]    Group#: [id number] Precert#: --          PCP: None Pt does not have a PCP. Pt declines a PCP being arranged. Address: None ([facility]) Patient stated that they have no PCP   Phone number: None   Current patient: []Yes []No   Approximate date of last visit:   Access to virtual PCP visits: []Yes []No    Pharmacy: [facility] [place] Transport: Pt's wife       Transition of care plan:       [x] Home with Home Health   - Provider:  [facility]. Referral sent via [employer] and accepted. CM will follow. NATALIIA [employer]    Care Management Interventions  PCP Verified by CM:  Yes (Pt reported he does not have a PCP and declined a PCP being arranged )  Transition of Care Consult (CM Consult): DME/Supply Assistance, [address]: No  Reason Outside [place]: Out of service area  [facility]: No  Discharge Durable Medical Equipment: Yes  Physical Therapy Consult: Yes  Occupational Therapy Consult: Yes  Speech Therapy Consult: No  Support Systems: Spouse/Significant Other  The Patient and/or Patient Representative was Provided with a Choice of Provider and Agrees with the Discharge Plan?: Yes  Freedom of Choice List was Provided with Basic Dialogue that Supports the Patient's Individualized Plan of Care/Goals, Treatment Preferences and Shares the Quality Data Associated with the Providers?: Yes  Discharge Location  Discharge Placement: Home with home health

## 2021-11-03 NOTE — PERIOP NOTES
Bedside shift change report given to [facility] [place] (oncoming nurse) by Hiwot Mansfield (offgoing nurse). Report included the following information SBAR, Kardex, Intake/Output, MAR, Recent Results and Cardiac Rhythm NSR.

## 2021-11-03 NOTE — PROGRESS NOTES
OCCUPATIONAL THERAPY EVALUATION/DISCHARGE  Patient: Romario Shelley (54 y.o. male)  Date: 11/3/2021  Primary Diagnosis: Primary osteoarthritis of left knee [M17.12]  Procedure(s) (LRB):  SPECIAL PROCEDURE OUTSIDE OF OR (Left) 1 Day Post-Op   Precautions: fall, WBAT L LE       ASSESSMENT  Based on the objective data described below, the patient presents with good overall activity tolerance on POD 1 of L TKA. Patient lives with his wife and confirms he will have assistance 24/7 when returning home. Patient demonstrated good understanding of education provided regarding adaptive ADL techniques, safe transfer techniques, energy conservation techniques, and home modifications to ensure safety when transitioning home. Patient performed transfers without LOB or physical assistance needed. He engaged in ADLs with good tolerance; Agreeable to partial sponge bath only as he was already dressed in street clothes. Patient has no further skilled OT needs and is cleared from OT services at this time. Current Level of Function (ADLs/self-care): Patient required SBA for OOB transfers using rolling walker; Patient was received up and remained up at end of tx. Patient is independent to mod I level for ADLs. Functional Outcome Measure: The patient scored 85/100 on the Barthel Index outcome measure. PLAN :    Recommendation for discharge: (in order for the patient to meet his/her long term goals)  No skilled occupational therapy/ follow up rehabilitation needs identified at this time. This discharge recommendation:  Has been made in collaboration with the attending provider and/or case management    IF patient discharges home will need the following DME: none       SUBJECTIVE:   Patient stated It feels good to sit up; I'd like to stay in the chair again if that's okay.     OBJECTIVE DATA SUMMARY:   HISTORY:   Past Medical History:   Diagnosis Date    Anticoagulated     Xarelto    COVID-19 vaccine series completed 04/15/2021    Moderna    Diverticulosis     HTN (hypertension)     Hyperlipemia     Hypothyroidism     Osteoarthritis     Paroxysmal A-fib (HCC)     Poor historian      Past Surgical History:   Procedure Laterality Date    HX ANKLE FRACTURE TX Left 2000    HX LUMBAR FUSION      HX ORTHOPAEDIC Left 06/30/2021    Removal of hardware       Prior Level of Function/Environment/Context: Patient lives with his wife. Expanded or extensive additional review of patient history:   Home Situation  Home Environment: Private residence  # Steps to Enter: 3  Rails to Enter: Yes  Hand Rails : Right  One/Two Story Residence: Two story, live on 1st floor  Living Alone: No  Support Systems: Spouse/Significant Other  Patient Expects to be Discharged to[de-identified] House  Current DME Used/Available at Home: Walker, rolling  Tub or Shower Type: Tub/Shower combination    Hand dominance: Right    EXAMINATION OF PERFORMANCE DEFICITS:  Cognitive/Behavioral Status:  Neurologic State: Alert; Appropriate for age  Orientation Level: Oriented X4  Cognition: Appropriate decision making; Follows commands  Perception: Appears intact  Perseveration: No perseveration noted  Safety/Judgement: Awareness of environment    Skin: Intact in the uppers    Edema: None noted in the uppers    Vision/Perceptual:    Tracking: Able to track stimulus in all quadrants w/o difficulty    Diplopia: No    Acuity: Within Defined Limits       Range of Motion:  WDL in the uppers    Strength:  WDL in the uppers    Coordination:  Fine Motor Skills-Upper: Left Intact; Right Intact    Gross Motor Skills-Upper: Left Intact;  Right Intact    Tone & Sensation:  Tone: normal  Sensation: intact     Balance:  Sitting: Intact  Standing: Intact    Functional Mobility and Transfers for ADLs:  Bed Mobility:  Rolling:  (pt was received up and remained up)  Scooting: Supervision (from the chair)    Transfers:  Sit to Stand: Stand-by assistance  Stand to Sit: Stand-by assistance  Bed to Chair: Stand-by assistance  Bathroom Mobility: Stand-by assistance  Toilet Transfer : Stand-by assistance    ADL Assessment:  Feeding: Independent    Oral Facial Hygiene/Grooming: Independent    Bathing: Modified independent    Upper Body Dressing: Independent    Lower Body Dressing: Modified independent    Toileting: Modified independent    Cognitive Retraining  Safety/Judgement: Awareness of environment    Functional Measure:    Barthel Index:  Bathin  Bladder: 10  Bowels: 10  Groomin  Dressing: 10  Feeding: 10  Mobility: 10  Stairs: 5  Toilet Use: 10  Transfer (Bed to Chair and Back): 10  Total: 85/100      The Barthel ADL Index: Guidelines  1. The index should be used as a record of what a patient does, not as a record of what a patient could do. 2. The main aim is to establish degree of independence from any help, physical or verbal, however minor and for whatever reason. 3. The need for supervision renders the patient not independent. 4. A patient's performance should be established using the best available evidence. Asking the patient, friends/relatives and nurses are the usual sources, but direct observation and common sense are also important. However direct testing is not needed. 5. Usually the patient's performance over the preceding 24-48 hours is important, but occasionally longer periods will be relevant. 6. Middle categories imply that the patient supplies over 50 per cent of the effort. 7. Use of aids to be independent is allowed. Score Interpretation (from [address] [facility] 83)    Independent   60-79 Minimally independent   40-59 Partially dependent   20-39 Very dependent   <20 Totally dependent     -Renny Kohler., Barthel, D.W. (1965). Functional evaluation: the Barthel Index. [address] ([address] [place] [address] (1997). The Barthel activities of daily living index: self-reporting versus actual performance in the old (> or = 75 years).  Journal of American Geriatric Society 45(7), 14 [facility], ALBERTO, Lamont Brock.Renetta (1999). Measuring the change in disability after inpatient rehabilitation; comparison of the responsiveness of the Barthel Index and Functional [place] Measure. Journal of Neurology, Neurosurgery, and Psychiatry, 66(4), 021-133. LORIE Nelson, LONA Bennett, & Cindy Araya M.A. (2004) Assessment of post-stroke quality of life in cost-effectiveness studies: The usefulness of the Barthel Index and the EuroQoL-5D. Quality of Life Research, 15, 910-61       Occupational Therapy Evaluation Charge Determination   History Examination Decision-Making   LOW Complexity : Brief history review  LOW Complexity : 1-3 performance deficits relating to physical, cognitive , or psychosocial skils that result in activity limitations and / or participation restrictions  LOW Complexity : No comorbidities that affect functional and no verbal or physical assistance needed to complete eval tasks       Based on the above components, the patient evaluation is determined to be of the following complexity level: LOW   Activity Tolerance:   Good    After treatment patient left in no apparent distress:    Sitting in chair, Call bell within reach and Bed / chair alarm activated    COMMUNICATION/EDUCATION:   The patients plan of care was discussed with: Physical therapist, Registered nurse and patient. .     Thank you for this referral.  ELLIOT Coronado/SURJIT  Time Calculation: 24 mins

## 2021-11-03 NOTE — DISCHARGE INSTRUCTIONS
G2 Orthopedics and Sports Medicine  Knee Replacement Discharge Instructions  Dr. Uvaldo Daniels     Activity:  You should be as active as you can tolerate within the guidelines you have been given. Perform the exercises you have been given twice daily. Use you walker or crutches as directed by your physician. You should rest for one hour twice a day with your feet elevated. Diet:  You may resume your regular home diet. Medications:    Pain:  You have been given a prescription for pain control. Take this medication as directed. Do not take more than prescribed. If your pain is not controlled by the medication you were given, please call your surgeons office. Anticoagulation:  You will continue your home Xarelto as prescribed previously for afib to prevent blood clots. Stool Softeners:  If it important to have regular bowel movements. Pain medications can cause constipation. Stool softeners, warm prune juice, increasing your water intake, and increasing fiber can help in preventing constipation. Do not take laxatives if at all possible except in severe situations. It can result in a vicious cycle between constipation and diarrhea. Ice Pack   Application of ice will prevent and treat inflammation. You should use this continuously during the day for the first week if you are able to do so. Afterwards, use as needed depending on the amount of swelling. Skin Care:  Dressing: Keep the dressing on for 7 days then remove. You may apply clean gauze if incision is bleeding or draining. Showering: You may shower any time. The dressing is waterproof. After removing the dressing at 7 days you may shower if incision is dry and not draining. If it is draing then do not shower, apply guaze and call the office [phone]. Home Health:  Home Physical Therapy will come to your home the day after discharge. Follow up Care:    Call to schedule your follow up appointment for 2 weeks.      Reasons to call your surgeon:     Fever above 101 for more than 24 hours   Persistent pain in the calf or either leg   Pain uncontrolled by your pain medication   Nausea or vomiting   Redness, swelling or drainage from your incision   Numbness, tingling, or change in your affected extremity    Or any other concern                       Prop your unaffected knee up, and note how straight that knee will go, this is how you can monitor your progress, and work to get your surgical knee as straight.

## 2021-11-03 NOTE — PROGRESS NOTES
Physical therapy note:    Physical therapy evaluation & second daily treatment completed, spoke with RN and CM. Patient will require RW and HHPT upon d/c. Patient ambulates without instability or buckling and ascends/descends 3 stairs with CGAx2. Recommend 2 person assist for stair ascent upon d/c home. At this time patient is clear from PT perspective to d/c home. Full notes to follow.     Thank you,  Terry Johnston, PT, DPT

## 2021-11-03 NOTE — PROGRESS NOTES
Problem: Mobility Impaired (Adult and Pediatric)  Goal: *Acute Goals and Plan of Care (Insert Text)  Description: FUNCTIONAL STATUS PRIOR TO ADMISSION: Patient was independent and active without use of DME.    HOME SUPPORT PRIOR TO ADMISSION: The patient lived with his spouse but did not require assist.    Physical Therapy Goals  Initiated 11/3/2021    1. Patient will move from supine to sit and sit to supine  in bed with independence within 4 days. 2. Patient will perform sit to stand with modified independence within 4 days. 3. Patient will ambulate with modified independence for 100 feet with the least restrictive device within 4 days. 4. Patient will ascend/descend 3 stairs with 1 handrail(s) & SPC with minimal assistance/contact guard assist within 4 days. 5. Patient will perform home exercise program per protocol with independence within 4 days. 6. Patient will demonstrate AROM 0-90 degrees in operative joint within 4 days. 11/3/2021 1442 by West Frank, PT  Outcome: Progressing Towards Goal  11/3/2021 1331 by West Frank, PT  Outcome: Not Met   PHYSICAL THERAPY TREATMENT  Patient: Rosibel Haile (75 y.o. male)  Date: 11/3/2021  Diagnosis: Primary osteoarthritis of left knee [M17.12]   <principal problem not specified>  Procedure(s) (LRB):  SPECIAL PROCEDURE OUTSIDE OF OR (Left) 1 Day Post-Op  Precautions: WBAT  Chart, physical therapy assessment, plan of care and goals were reviewed. ASSESSMENT  Patient continues with skilled PT services and is progressing towards goals. Patient this afternoon with good tolerance to physical therapy session. He is received sitting upright in bedside chair, working on his therapeutic exercises. Patient ambulates 80ft with CGA x 1 and RW. He demonstrates step through gait this afternoon, however continues to demonstrate thoracic flexion and increased use of UE on RW despite cuing. Patient ascends/descends 3 steps with CGAx2, single handrail and SPC. There is no instability or buckling, however patient utilizing significant UE support to achieve stair navigation. Recommend 2 person assist for ascent of stairs at home in light of patient size. At this time patient is clear from PT perspective for safe d/c home. Current Level of Function Impacting Discharge (mobility/balance): CGAx2 stair navigation    Other factors to consider for discharge: none additional         PLAN :  Patient continues to benefit from skilled intervention to address the above impairments. Continue treatment per established plan of care. to address goals. Recommendation for discharge: (in order for the patient to meet his/her long term goals)  Physical therapy at least 2 days/week in the home AND ensure assist and/or supervision for safety with stair ascent     This discharge recommendation:  Has been made in collaboration with the attending provider and/or case management    IF patient discharges home will need the following DME: RW has been delivered for discharge       SUBJECTIVE:   Patient stated \" we live over an hour away so I would like to know ahead of time when I am leaving.     OBJECTIVE DATA SUMMARY:   Patient received seated in bedside chair, agreeable to participate in PT session. Patient was cleared by nursing to participate in PT session.      Critical Behavior:  Neurologic State: Alert, Appropriate for age  Orientation Level: Oriented X4  Cognition: Appropriate decision making, Follows commands  Safety/Judgement: Awareness of environment    Range of Motion:  AROM: Generally decreased, functional                 LLE AROM  L Knee Flexion: 90       Functional Mobility Training:  Bed Mobility:  Rolling:  (pt was received up and remained up)  Supine to Sit: Supervision     Scooting: Supervision        Transfers:  Sit to Stand: Contact guard assistance; Assist x1  Stand to Sit: Contact guard assistance; Assist x1        Bed to Chair: Contact guard assistance; Assist x1 Balance:  Sitting: Intact  Standing: Impaired; With support  Standing - Static: Good  Standing - Dynamic : Fair  Ambulation/Gait Training:  Distance (ft): 80 Feet (ft)  Assistive Device: Gait belt; Walker, rolling  Ambulation - Level of Assistance: Contact guard assistance; Assist x1        Gait Abnormalities: Antalgic; Step to gait; Decreased step clearance     Left Side Weight Bearing: As tolerated        Speed/Ingrid: Pace decreased (<100 feet/min)                   Stairs:  Number of Stairs Trained: 3  Stairs - Level of Assistance: Contact guard assistance; Assist X2   Rail Use: Left & R SPC        Pain Rating:  Patient with minimal complaints of pain during therapy      Activity Tolerance:   Good and tolerates ADLs without rest breaks    After treatment patient left in no apparent distress:   Sitting in chair, Call bell within reach, and Bed / chair alarm activated    COMMUNICATION/COLLABORATION:   The patients plan of care was discussed with: Occupational therapist, Registered nurse, and Case management.      Cherylene Bouton PT, DPT   Time Calculation: 28 mins

## 2021-11-03 NOTE — FACE TO FACE
Rounded on patient, f/u from Joint Pre-op Patient Education Class. Patient did not participate in class. Patient states their home space is prepared and safe for recovery. Reviewed ice application, exercises and incentive spirometry use. Questions answered.

## 2021-11-03 NOTE — PROGRESS NOTES
Problem: Mobility Impaired (Adult and Pediatric)  Goal: *Acute Goals and Plan of Care (Insert Text)  Description: FUNCTIONAL STATUS PRIOR TO ADMISSION: Patient was independent and active without use of DME.    HOME SUPPORT PRIOR TO ADMISSION: The patient lived with his spouse but did not require assist.    Physical Therapy Goals  Initiated 11/3/2021    1. Patient will move from supine to sit and sit to supine  in bed with independence within 4 days. 2. Patient will perform sit to stand with modified independence within 4 days. 3. Patient will ambulate with modified independence for 100 feet with the least restrictive device within 4 days. 4. Patient will ascend/descend 3 stairs with 1 handrail(s) & SPC with minimal assistance/contact guard assist within 4 days. 5. Patient will perform home exercise program per protocol with independence within 4 days. 6. Patient will demonstrate AROM 0-90 degrees in operative joint within 4 days. Outcome: Not Met   PHYSICAL THERAPY EVALUATION  Patient: Shailesh Bowen (11 y.o. male)  Date: 11/3/2021  Primary Diagnosis: Primary osteoarthritis of left knee [M17.12]  Procedure(s) (LRB):  SPECIAL PROCEDURE OUTSIDE OF OR (Left) 1 Day Post-Op   Precautions: WBAT      ASSESSMENT  Based on the objective data described below, the patient presents with decreased ROM, strength, and intact sensation of operative leg; + SLR in the setting of POD1 L TKA. Noted edema of operative ankle. PTA patient was independent and active, lives with his wife in 1 story home with 3STE. Patient today with good tolerance and pain control for physical therapy. He ambulates 80ft within halls, to and from bathroom, CGAx2 with RW. Patient given two opportunities on commode, unsuccessful for bowel movement. Patient with significant thoracic flexion throughout ambulation, improved only mildly with cuing to stand up tall. Patient ascends/descends 3 stairs with minAx2, single hand rail and SPC.  Patient requiring maximal cuing for sequencing, feels his knee is \"buckling\" on ascent, however no buckling or instability visible to this PT. Patient would benefit from additional PT session this afternoon to practice the stairs again. Recommend HHPT upon d/c, patient will require RW. Patient left seated in bedside chair, chair alarm activated and SCDs activated. Current Level of Function Impacting Discharge (mobility/balance): minAx2 stairs     Functional Outcome Measure: The patient scored Total Score: 16/28 on the Tinetti outcome measure which is indicative of high fall risk. Other factors to consider for discharge: none additional     Patient will benefit from skilled therapy intervention to address the above noted impairments. PLAN :  Recommendations and Planned Interventions: bed mobility training, transfer training, gait training, therapeutic exercises, edema management/control, patient and family training/education, and therapeutic activities      Frequency/Duration: Patient will be followed by physical therapy:  twice daily to address goals. Recommendation for discharge: (in order for the patient to meet his/her long term goals)  Physical therapy at least 2 days/week in the home     This discharge recommendation:  Has been made in collaboration with the attending provider and/or case management    IF patient discharges home will need the following DME: rolling walker         SUBJECTIVE:   Patient stated [place] I put on my clothes? Dominga Pate    OBJECTIVE DATA SUMMARY:   HISTORY:    Past Medical History:   Diagnosis Date    Anticoagulated     Xarelto    COVID-19 vaccine series completed 04/15/2021    Moderna    Diverticulosis     HTN (hypertension)     Hyperlipemia     Hypothyroidism     Osteoarthritis     Paroxysmal A-fib ([address]     Poor historian      Past Surgical History:   Procedure Laterality Date    HX ANKLE FRACTURE TX Left 2000    HX LUMBAR FUSION      HX ORTHOPAEDIC Left 06/30/2021    Removal of hardware       Personal factors and/or comorbidities impacting plan of care: none additional    Home Situation  Home Environment: Private residence  # Steps to Enter: 3  Rails to Enter: Yes  Hand Rails : Right  One/Two Story Residence: Two story, live on 1st floor  Living Alone: No  Support Systems: Spouse/Significant Other  Patient Expects to be Discharged to[de-identified] House  Current DME Used/Available at Home: Walker, rolling  Tub or Shower Type: Tub/Shower combination    EXAMINATION/PRESENTATION/DECISION MAKING:   Critical Behavior:  Neurologic State: Alert, Appropriate for age  Orientation Level: Oriented X4  Cognition: Appropriate decision making, Follows commands  Safety/Judgement: Awareness of environment  Hearing:     Skin:  all observed intact; surgical dressing clean & dry  Edema: L ankle 1+  Range Of Motion:  AROM: Generally decreased, functional        LLE AROM  L Knee Flexion: 90              Strength:    Strength: Generally decreased, functional                    Tone & Sensation:   Tone: Normal              Sensation: Intact               Coordination:  Coordination: Within functional limits  Vision:   Tracking: Able to track stimulus in all quadrants w/o difficulty  Diplopia: No  Acuity: Within Defined Limits  Functional Mobility:  Bed Mobility:  Rolling:  (pt was received up and remained up)  Supine to Sit: Supervision     Scooting: Supervision  Transfers:  Sit to Stand: Contact guard assistance; Assist x2  Stand to Sit: Contact guard assistance; Assist x2        Bed to Chair: Contact guard assistance; Assist x2              Balance:   Sitting: Intact  Standing: Impaired; With support  Standing - Static: Good  Standing - Dynamic : Fair; Constant support  Ambulation/Gait Training:  Distance (ft): 80 Feet (ft)  Assistive Device: Gait belt; Walker, rolling  Ambulation - Level of Assistance: Contact guard assistance; Assist x2        Gait Abnormalities: Antalgic; Step to gait;  Decreased step clearance (increased trunk flexion)     Left Side Weight Bearing: As tolerated        Speed/Ingrid: Pace decreased (<100 feet/min)                    Stairs:  Number of Stairs Trained: 3  Stairs - Level of Assistance: Minimum assistance; Assist X2   Rail Use: Left     Therapeutic Exercises:   Therapeutic Exercises:       EXERCISE   Sets   Reps   Active Active Assist   Passive Self ROM   Comments   Ankle Pumps 1 20 [x] [] [] []    Quad Sets 1 10 [x] [x] [] [] + self manual over pressure   Hamstring Sets 1 10 [x] [] [] []    Short Arc Quads   [] [] [] []    Heel Slides   [] [] [] []    Straight Leg Raises 1 10 [x] [] [] []    Hip abd/add   [] [] [] []    Long Arc Quads   [] [] [] []    Marching   [] [] [] []       [] [] [] []          Functional Measure:  Tinetti test:    Sitting Balance: 1  Arises: 1  Attempts to Rise: 2  Immediate Standing Balance: 0  Standing Balance: 1  Nudged: 1  Eyes Closed: 1  Turn 360 Degrees - Continuous/Discontinuous: 0  Turn 360 Degrees - Steady/Unsteady: 1  Sitting Down: 1  Balance Score: 9 Balance total score  Indication of Gait: 1  R Step Length/Height: 1  L Step Length/Height: 1  R Foot Clearance: 1  L Foot Clearance: 1  Step Symmetry: 0  Step Continuity: 0  Path: 1  Trunk: 0  Walking Time: 1  Gait Score: 7 Gait total score  Total Score: 16/28 Overall total score         Tinetti Tool Score Risk of Falls  <19 = High Fall Risk  19-24 = Moderate Fall Risk  25-28 = Low Fall Risk  Tinetti ME. Performance-Oriented Assessment of Mobility Problems in Elderly Patients. [address]; [id number].  (Scoring Description: PT Bulletin Feb. 10, 1993)    Older adults: Ben Berenice et al, 2009; n = 1000 [facility] elderly evaluated with ABC, JULIET, ADL, and IADL)  · Mean JULIET score for males aged 69-68 years = 26.21(3.40)  · Mean JULIET score for females age 69-68 years = 25.16(4.30)  · Mean JULIET score for males over 80 years = 23.29(6.02)  · Mean JULIET score for females over 80 years = 17.20(8.32)        Physical Therapy Evaluation Charge Determination   History Examination Presentation Decision-Making   MEDIUM  Complexity : 1-2 comorbidities / personal factors will impact the outcome/ POC  MEDIUM Complexity : 3 Standardized tests and measures addressing body structure, function, activity limitation and / or participation in recreation  MEDIUM Complexity : Evolving with changing characteristics  LOW Complexity : FOTO score of       Based on the above components, the patient evaluation is determined to be of the following complexity level: LOW     Pain Rating:  3/10 to surgical site    Activity Tolerance:   Good and tolerates ADLs without rest breaks      After treatment patient left in no apparent distress:   Sitting in chair, Call bell within reach, and Bed / chair alarm activated    COMMUNICATION/EDUCATION:   The patients plan of care was discussed with: Occupational therapist, Registered nurse, and Case management. Fall prevention education was provided and the patient/caregiver indicated understanding. and Patient/family have participated as able in goal setting and plan of care.     Thank you for this referral.  Subhash Matthews PT, DPT   Time Calculation: 33 mins

## 2021-11-04 NOTE — OP NOTES
Johan Strange [facility] 81  OPERATIVE REPORT    Name:  Nicol Hair  MR#:  [id number]  :  1945  ACCOUNT #:  [de-identified]  DATE OF SERVICE:  2021    PREOPERATIVE DIAGNOSIS:  Left knee osteoarthritis. POSTOPERATIVE DIAGNOSIS:  Left knee osteoarthritis. PROCEDURE PERFORMED:  Left total knee replacement. SURGEON:  MD Sandoval Oseguera    ANESTHESIA:  General plus regional block. COMPLICATIONS:  None. SPECIMENS REMOVED:  None. IMPLANTS:  DonJoy Press-Fit size 9 femur, size 9 tibia, 10-mm polyethylene insert and 38 cemented patellar button. ESTIMATED BLOOD LOSS:  Minimal.    DRAINS:  None. PROCEDURE:  After being given a regional block in holding area, the patient was brought to the operating room and given general anesthesia in a supine position on the OR table. A tourniquet and soft roll were applied to the left thigh. A timeout was performed. Preoperative prophylactic antibiotic infusion was confirmed. The left leg was prepped and draped in a sterile fashion with ChloraPrep. The extremity was examined and then exsanguinated. The tourniquet was inflated. An Ioban isolation drape was used. Midline incision medial parapatellar arthrotomy performed. There were severe degenerative changes with loss of articular cartilage in all three compartments with greatest wear in the medial compartment. The [facility] computer navigation was used for distal femoral and proximal tibial cuts set at neutral alignment in the coronal plane with 9 mm distal femoral resection and 1 degree of femoral flexion on the tibial side, 6 degrees posterior slope with 8 mm under the unaffected lateral tibial plateau. The extension gap was 10 mm. The posterior referencing guide used for the femoral side and set to 5 degrees of external rotation. The anterior, posterior and chamfer cuts were made without notching.   Posterior remnant meniscal tissue and osteophytes were removed. The capsule injected with the joint pain solution. The trial components were inserted with good motion and stability. The patella was resected from 25 to 16 mm and sized to a 38. There was good patellar tracking. Trial components removed. The joint was irrigated and the proximal tibia was prepared chlorhexidine solution was used for further irrigation and the components were inserted in a standard fashion. The patellar button was clamped and excess cement removed. The capsule deep and subcutaneous tissues were injected with the remainder of the joint pain solution. Once the cement cured, the clamp was removed. Irrigation with PulsaVac lavage and chlorhexidine. With the knee in flexion, the extensor mechanism was closed with multiple interrupted #2 FiberWire sutures followed by running #2 Stratafix suture with subcutaneous 2-0 Vicryl and a 3-0 Monocryl subcuticular suture. A sterile dressing was applied. Tourniquet was deflated. The patient awakened, returned to Recovery in stable condition. His wife notified of his condition.       Wally Adams MD      [id number]  D:  11/03/2021 15:41  T:  11/03/2021 20:33  JOB #:  [id number]

## 2021-11-09 ENCOUNTER — TELEPHONE (OUTPATIENT)
Dept: SURGERY | Age: 76
End: 2021-11-09

## 2021-11-09 NOTE — TELEPHONE ENCOUNTER
Post Discharge Phone placed to patient after Joint Replacement surgery   Spoke with patient    Patient is taking narcotics  States pain is tolerable and pain medication is effective.    Patient was able to fill prescriptions, no medication questions    States discharge instructions were clear and easy to understand has no questions  Patient is using a walker without difficulty, moving every hour  Able to do exercises regularly  Bruising is minimal  Swelling is moderate  Patient is elevating leg and using ice with good relief  Education r/t positioning provided  States incision has been bleeding, HH nurse care being provided, Dr. Janice Mo aware  Denies N/V, appetite is good  Constipation is none, +BM  Follow up appointment is scheduled with surgeon   PT is not being scheduled until next week per Dr. Janice Mo   Denies fever, cough, chest pain, SOB  Denies any unusual symptoms  Discussed calling surgeon or PCP for concerns  Opportunity given for patient to ask questions  Call duration 3:50 minutes

## 2022-03-18 PROBLEM — M17.12 PRIMARY OSTEOARTHRITIS OF LEFT KNEE: Status: ACTIVE | Noted: 2021-11-02

## 2023-03-15 ENCOUNTER — HOSPITAL ENCOUNTER (OUTPATIENT)
Dept: PHYSICAL THERAPY | Age: 78
Discharge: HOME | End: 2023-03-15
Payer: MEDICARE

## 2023-03-15 PROCEDURE — 97110 THERAPEUTIC EXERCISES: CPT

## 2023-03-15 PROCEDURE — 97166 OT EVAL MOD COMPLEX 45 MIN: CPT

## 2023-03-15 PROCEDURE — 97530 THERAPEUTIC ACTIVITIES: CPT

## 2023-03-15 NOTE — THERAPY EVALUATION
Statement of Medical Necessity  Page 1 of 2      Brendon Hardin 1945 Today's Date: 3/15/2023 SEUN: [employer]   Payor: BLUE CROSS / Plan: [address] [facility] [place] / Product Type: PPO /  ME: TBD  Refills: 2               Diagnosis  []   I97.2 Post-Mastectomy Lymphedema []   I87.2 Venous Insufficiency   [x]   I89.0 Lymphedema, other secondary  []   I83.019 Venous Stasis Ulcer LE, Right   []   I89.9 Unspecified Lymphatic Disorder []   I83.029 Venous Stasis Ulcer LE, Left   []   R60.9 Swelling not relieved by elevation []   Q82.0 Hereditary/ Congenital Lymphedema   []   C50.211 Malignant neoplasm of breast, Right []   G89.3  Cancer associated pain   []   C50.212 Malignant neoplasm of breast, Left []   L03.115 LE Cellulitis, Right   []    []   L03.116 LE Cellulitis, Left                                                           Brendon Hardin    1945  Page 2 of 2    Physician Order for DME for Diagnosis of BLE lymphedema as Listed on Statement of Medical Necessity, Page 1        Recommended Product:  Units Upper Extremity Rt Lt Units Lower Extremity Rt Lt    Circ-Aid, Ready Wrap, Sigvaris Arm   4 Inelastic binders (Circ-Aid, Farrow)  [x]Foot   [x]Below Knee   []Knee   []Thigh x x    Circ-Aid Ready Wrap, Sigvaris hand    Bhavik Mariusz, night use []Full Leg  []Lower Leg      Tribute Arm, night use   2 Tribute, night use  []Full Leg  [x]Lower Leg      Bhavik Mariusz Arm, night use   2 Bebeto Sleeve Leg/ Foot, night use x x    Gradiant Compression Sleeves & Gloves  []Custom [] RM Arm:  []CCL1 []CCL2 []CCL3  []Custom [] RM Glove: []CCL1 []CCL2 []CCL3     6 Gradient Compression Stockings   [x]Custom  [x]RM Lower Extremity:   [x]CCL1       [x]CCL2         [x]CCL3   [x]Knee       []Thigh        []Waist Length x x    Bebeto sleeve arm w/ hand, night use    Tribute Wrap, night use      Compression Bra   1 vasopneumatic compression pump x x    Compression Vest         The above patient was referred for treatment of Lymphedema due to the diagnosis indicated above. Lymphedema is a progressive and chronic condition. It may cause acute infections, muscle atrophy, discomfort, and connective tissue fibrosis with irreversible tissue damage, decreased ROM in the affected limb and diminished functional mobility possibly interfering with independence and ability to work. Recurrent infections and wound complications that commonly occur with Lymphedema often require hospitalization and extensive wound care, thus increasing medical costs. The patient has received complete decongestive therapy which includes manual lymphatic drainage, lymphedema specific exercises, compression bandaging, and hygiene/skin care. Goals of therapy are to reduce the edema and prevent re-accumulation of fluid with its complications. It is medically necessary for this patient to have daytime garments to control this condition. They will need 2 sets (one set to wear and one set to wash for adequate skin care and wearing time). Garments must be replaced every 4-6 months for effectiveness. There are no substitutes available that offer acceptable compression treatment for this Lymphedema patient. If further information is requested, please contact our certified lymphedema therapists at [phone].     [] Dakotah Chu, PT, DPT, CLT-SHERYL  [] Smith Quezada, PT, [facility]  [] Kar Alvarez PT, DPT, CLT-SHERLY  [] Kusum Jimenez, PT, DPT, CLT   [x] Yanique Garcia, OT, OTD, CLT    Printed  Provider Name Imani Greenwood MD Provider Signature  Date    Provider NPI [id number]

## 2023-03-15 NOTE — THERAPY EVALUATION
[address]   [address] [facility].  [address]  [address]      INITIAL EVALUATION    NAME: Sarah Sousa AGE: 78 y.o. GENDER: male  DATE: 03/15/2023  REFERRING PHYSICIAN: Phuong Huffman MD  HISTORY AND BACKGROUND: Mr. Rosy Dahl presents to the lymphedema clinic today w/B lower leg swelling that began 1 year ago after L total knee replacement and became progressively worse in the last 6 months. He has been assessed by primary care, cardiologist and now orthopedic surgeon who has referred him to lymphedema clinic. Patient is now having to ambulate w/a cane d/t mobility deficits resulting from swelling. PMH is significant for high cholesterol, HTN, A fib, Hypothyroidism, L total knee replacement, L ankle fracture w/revision surgery required 2000. Primary Diagnosis:  BLE lymphedema, secondary (I89.0)  Other Treatment Diagnoses:  Abnormality of gait R26.89  Swelling not relieved by elevation R60.9     Date of Onset: 6 months ago     Past Medical History:   Past Medical History:   Diagnosis Date    Anticoagulated     Xarelto    COVID-19 vaccine series completed 04/15/2021    Moderna    Diverticulosis     HTN (hypertension)     Hyperlipemia     Hypothyroidism     Osteoarthritis     Paroxysmal A-fib ([address]     Poor historian      Past Surgical History:   Procedure Laterality Date    HX ANKLE FRACTURE TX Left 2000    HX LUMBAR FUSION      HX ORTHOPAEDIC Left 06/30/2021    Removal of hardware     Current Medications:    Current Outpatient Medications   Medication Sig    naloxone (NARCAN) 4 mg/actuation nasal spray Use 1 spray intranasally, then discard. Repeat with new spray every 2 min as needed for opioid overdose symptoms, alternating nostrils. omeprazole (PRILOSEC) 20 mg capsule Take 20 mg by mouth daily. ALPRAZolam (Xanax) 0.25 mg tablet Take 0.25 mg by mouth two (2) times daily as needed for Anxiety.     ZINC PO Take 1 Tablet by mouth daily. cholecalciferol, vitamin D3, (VITAMIN D3 PO) Take 5,000 Units by mouth daily. vitamin E acetate (VITAMIN E PO) Take 1 Tablet by mouth daily. cetirizine HCl (ZYRTEC PO) Take 1 Tablet by mouth daily. levothyroxine (Synthroid) 175 mcg tablet Take 175 mcg by mouth every other day. levothyroxine (Synthroid) 150 mcg tablet Take 150 mcg by mouth every other day. olmesartan (Benicar) 40 mg tablet Take 40 mg by mouth daily. triamterene-hydroCHLOROthiazide (MAXZIDE) 37.5-25 mg per tablet Take 1 Tablet by mouth daily. hydrALAZINE (APRESOLINE) 25 mg tablet Take 50 mg by mouth nightly. dilTIAZem ER (DILACOR XR) 120 mg capsule Take 360 mg by mouth daily. OTHER Take 1 Tablet by mouth daily. Leternano Zeaxanthin + Saw Palmetto    amLODIPine (Norvasc) 10 mg tablet Take 10 mg by mouth daily. rivaroxaban (Xarelto) 20 mg tab tablet Take 20 mg by mouth daily. No current facility-administered medications for this encounter. Allergies:    Allergies   Allergen Reactions    Valium [Diazepam] Other (comments)     Made him overstimulated- hit the doctor      Prior Level of Function/Social/Work History/Personal factors and/or comorbidities impacting plan of care: Pt is retired, IND w/ADLs and shares IADLs with his wife    Living Situation: in home w/wife      Trainable Caregiver?: yes   Self-care/ADLs: Mod I     Mobility: Mod I   Sleeping Arrangement:  In bed at night    Adaptive Equipment Owned: SPC, walker     Previous Therapy:  Home health, then OP PT for L knee replacement, has never been seen by lymphedema clinic   Compression/Lymphedema Equipment:  none        SUBJECTIVE:   \"The PT's have never really addressed the swelling to help this leg move better\"  Patients goals for therapy: Reduce swelling, improve LLE ROM    OBJECTIVE DATA SUMMARY:   EXAMINATION/PRESENTATION/DECISION MAKING:   Pain:   0/10, increases to 5/10 at times w/prolonged standing/activity         Self-Care and ADLs:  Grooming: Modified independent  Bathing: Modified independent    UB Dressing: Modified independent  LB Dressing: Modified independent    Don/Doff Shoes/Socks: Modified independent  Toileting: Modified independent          Skin and Tissue Assessment:  Dermal Status:  [x]   Intact [x]  Dry   []  Tenuous [x] Flaky   []  Wound/lesion present [x]  Scars L knee   []  Dermatitis    Texture/Consistency:  [x]  Boggy [x]  Pitting Edema   []  Brawny []  Combination   [x]  Fibrotic/Woody    Pigmentation/Color Change:  []  Normal []  Hemosiderin   []  Red [x]  Erythematous   [x]  Hyperpigmented []  Hyperlipodermatosclerosis   Anomalies:  []  Lymphorrhea [x]  Vesicles   []  Petechiae []  Warty Vercusis   []  Bullae []  Papilloma   Circulatory:  []  DENY [x]  Varicosities:   [x]  Pulses []  Vascular studies ruled out DVT in past   []  DVT History    Nails:  discolored  Stemmers Sign: negative   Height:   6'1.5\"   Weight:   272 lbs   BMI:   35  (36 or greater: adversely affecting lymphedema)  Wound/Ulcer:  none        Volumetric Measurements:   Right:  5146.45mL Left:  4643.03 mL   % Difference: 10.84 Dominance: R (note 0 measurement for BLE starting slightly above ankles d/t abnormal shape to L ankle 2/2 swelling and possible cyst or lipoma)   (See scanned graph)    Photos       L ankle              Range of Motion: WFL  Strength: WFL  Sensation:  normal   Mobility:  Bed/Chair Mobility:  Modified independent  Transfers:  Modified independent    Sitting Balance:  Fair Standing Balance:  Poor   Gait:  Modified independent  Wheelchair Mobility:  (Other)n/a   Endurance:  Poor, requires frequent rest breaks  Stairs:  Modified independent          Safety:  Patient is alert and oriented:  x3   Safety awareness:  Fair   Fall Risk?:  Yes   Patient given written fall prevention handout: Yes   Precautions:  Standard lymphedema precautions to include avoiding blood pressure readings, injections and IVs or other procedures/acts that could lead to broken skin on affected area, and avoiding excessive heat, resistive activity or altitude without compression garment    Functional Measure:   LLIS: 38/68 or 56% impairment      Physical Therapy Evaluation Charge Determination   History Examination Presentation Decision-Making   MEDIUM  Complexity : 1-2 comorbidities / personal factors will impact the outcome/ POC  MEDIUM Complexity : 3 Standardized tests and measures addressing body structure, function, activity limitation and / or participation in recreation  MEDIUM Complexity : Evolving with changing characteristics  Other outcome measures LLIS  MEDIUM      Based on the above components, the patient evaluation is determined to be of the following complexity level: MEDIUM    Evaluation Time: 5111-9879: 30 minutes     TREATMENT PROVIDED:   1. Treatment description:  Therapeutic Activityx2: The patient was educated regarding the role and function of the lymphatic system,and instructed in the lymphedema management protocol of complete decongestive therapy (CDT). This includes skin care to prevent breakdown or infection, lymphedema exercises, custom compression therapy options (bandaging, compression garments, compression pump, Glenda [place], JoViPak, [employer]Andrea, etc. as needed), and decongestion with manual lymphatic drainage as indicated. We discussed the need for consistent compression system for lymphedema management. Diaphragmatic breathing instruction and skin care education was performed, applying low pH lotion to extremity using upward strokes to stimulate lymphatic vessels. Discussed treatment options at length including phase I CDT for MLD and lower leg bandaging. Once limb volumes reduced, will measure for compression garments. Patient instructed in activity restrictions and exercise guidelines. Patient instructed in skin care principles and anatomy of the lymphatic system.  Standard lymphedema precautions to include avoiding blood pressure readings, injections and IVs or other procedures/acts that could lead to broken skin on affected area, and avoiding excessive heat, resistive activity or altitude without compression garment. Treatment time: 1145:12:10 Minutes: 25    2. Treatment description:  Therapeutic Exercise: Pt given BLE lymphedema HEP including toe scrunches, toe spread, ankle pumps, ankle circles, leg extension, seated march x5 and diaphragmatic breathing 5x pre and post to promote circulation and limb decongestion in order to reduce swelling and risk of skin breakdown/infection   Treatment time:  12:10-12:20  Minutes: 10    Pain Level: 0/10    ASSESSMENT:   Evangelista Mercer is a 78 y.o. male who presents with Stage II lymphedema that has become progressively worse over the last year. Pt has been ruled out for blood clots and any other cardiac causes by PCP, cardiologist and orthopedic surgeon. Patient has swelling in BLE that he reports does reduce some at nighttime. Patient also has significant area of increased swelling and a possible lipoma or cyst in his L ankle, reports it has been present for many years and he had multiple surgeries in area, MD is aware and not concerned. Patient will benefit from complete decongestive therapy (CDT) including manual lymphatic drainage (MLD) technique, short-stretch textile bandages/compression system to decongest limb, and kinesiotaping as appropriate. Patient will receive instruction in proper skin care to recognize signs/symptoms of and prevent infection, therapeutic exercise, and self-MLD for independent home program and restorative lymphatic performance. This care is medically necessary due to the infection risk with lymphedema, and to improve functional activities. CDT is necessary to resolve swelling to allow patient to return to wearing normal clothes/footwear, and prevent worsening of symptoms, such as venous stasis ulcerations, infections, or hospitalizations. Patient will be independent with home program strategies to allow improved ADL ability and mobility and to allow patient to return to greatest functional independence. Rehabilitation potential is considered to be Good. Factors which may influence rehabilitation include Pt is motivated to actively participate and has supportive family/caregivers. Patient will benefit from 24 occupational therapy visits over 12 weeks to optimize improvement in these areas. PLAN OF CARE:   Recommendations and Planned Interventions:  Manual lymph drainage/complete decongestive therapy  Multi-layer compression bandaging (short-stretch)  Compression garment fitting/provision  Lymphedema therapeutic exercise  AROM/PROM/Strength/Coordination  Self-care training  Functional mobility training  Education in skin care and lymphedema precautions  Self-MLD education per home program  Self-bandaging education per home program  Caregiver education as needed  Wound care as needed     GOALS  Short term goals  Time frame: 12 visits  Patient will independently identify at least 5 practices of proper skin care/infection prevention over 3 consecutive sessions to reduce risk of infections and wounds in 12 visits. Patient will demonstrate independence in lymphedema home program of therapeutic exercises and self MLD over 3 consecutive sessions to improve circulation and decongest limb and for increased independence/tolerance for functional activities within 12 visits. Patient will demonstrate decreased volumetric measurements by 500 in each LE in order to reduce risk for infection, and increased independence/tolerance for functional activity completion within 12 visits     Long term goals  Time frame: 24 visits  Patient will demonstrate improved edema management evidenced by performing donning/doffing of garments from dependent to modified independent 3/3 times within session to aid in reducing risk for infection in 24 visits  2.  Patient will demonstrate decrease in self-perceived functional impairment as evidenced by improved score on LLIS outcome measure from 56% impairment to 25% impairment within 24 visits. 3. Patient will demonstrate stable limb volumes of B lower extremities with no more than 400 mL increase/decrease over 3 visits to increase patient's ability to safely transition to home maintenance phase of CDT within 24 visits. Patient has participated in goal setting and agrees to work toward plan of care. Patient was instructed to call if questions or concerns arise. Thank you for this referral.  Niyah Garcia OT       TREATMENT PLAN EFFECTIVE DATES:   3/15/2023 TO 6/7/2023  I have read the above plan of care for Jessica Jc. I certify the above prescribed services are required by this patient and are medically necessary.   The above plan of care has been developed in conjunction with the lymphedema/occupational therapist .       Physician Signature: ____________________________________Date:______________

## 2023-03-21 ENCOUNTER — HOSPITAL ENCOUNTER (OUTPATIENT)
Dept: PHYSICAL THERAPY | Age: 78
Discharge: HOME | End: 2023-03-21
Payer: MEDICARE

## 2023-03-21 PROCEDURE — 97110 THERAPEUTIC EXERCISES: CPT

## 2023-03-21 PROCEDURE — 97530 THERAPEUTIC ACTIVITIES: CPT

## 2023-03-21 PROCEDURE — 97140 MANUAL THERAPY 1/> REGIONS: CPT

## 2023-03-21 NOTE — PROGRESS NOTES
LYMPHEDEMA OT DAILY TREATMENT NOTE - [facility] 2-15    Patient Name: Bard Virk  Date:3/21/2023  : 1945  [x]  Patient  Verified  [place] / Plan: [place] MED[place] PART [place] / Product Type: Medicare /    In time:930  Out time:104  Total Treatment Time (min): 70  Total Timed Codes (min): 70  1:1 Treatment Time (MC only): 79   Visit #:  2    Treatment Area: Lymphedema, not elsewhere classified [I89.0]    SUBJECTIVE  Pain Level (0-10 scale): 0/10  Any medication changes, allergies to medications, adverse drug reactions, diagnosis change, or new procedure performed?: [x] No    [] Yes (see summary sheet for update)  Subjective functional status/changes:   [] No changes reported  \"I want to go ahead and bandage both legs\"    OBJECTIVE    10 min Therapeutic Exercise:  [] Alaina Bolds Exercises [x] Remedial Lymphedema Exercise Program [] Axillary Web Exercise Program      [] Shoulder ROM Exercises    Reviewed BLE exercises including diaphragmatic breathing 5x pre and post, seated march, knee flexion/extension, ankle pumps, ankle rotation, toe scrunches, toe abduction 10x each leg w/compression on to facilitate reduced swelling in BLE   Rationale: Activate muscle pump to improve lymphatic fluid movement and decrease swelling to improve the patients ability to perform ADL and IADL skills and prevent worsening of swelling over time. 30  30 min Manual Therapy  Therapeutic Activity:      Kinesiotaping: Deferred        Manual Lymphatic Drainage (MLD):  Area to decongest: LE: bilateral foot ankle calf   Sequence used and effectiveness: Modifications were made to manual lymph drainage sequence to exclude cervical techniques secondary to patient's age. Secondary sequence for lower extremities without trunk involvement.   Diaphragmatic breaths 5x pre and post MLD, Opened B IA anastomoses, Inguinal ln, femoral triangle, full leg sequence BLE, reworked Inguinal ln and BLE    Skin/wound care/debridement: Reviewed skin care principles:   Performed skin care with low pH lotion following manual lymph principles. Skin care products  Hygiene  Prevention of cellulitis   Applied multi-layer compression bandaging to: Patient arrived without adequate bandage in place that was applied by caregiver. bilateral  lower extremity: below knee    The following multi-layer bandages were applied:  Protouch Stockinette    Padding layer:  Fleece  Cellona added to L foot and ankle to shape out around areas of increased swelling    Foam:  10 cm roll Comprifoam    Short stretch bandages:   6 cm  8 cm  10 cm    Proximal lower legs covered w/size H tubigrip and distal lower legs covered w/knee high hose stocking to prevent shearing. Pt brought his own post op shoe for LLE but given additional for RLE for appropriate fit and reduced fall risk     Pt educated in bandage precautions and given handout to review as follows: When To Take the Bandages OFF:   If toes are cold, and look blue. If the bandages become wet or soiled  If you become short of breath (while at rest), remove bandages and call your MD/911. If you have any pain, numbness or tingling that is not resolved by movement or change in position  DO NOT:   Stick any sharp things down into the bandage to itch.pat the bandage. DO:   Exercises daily 2x/day: Deep breathing; ankle pumps, toe curls, ankle circles, leg kicks, and marching seated in a chair. Move about every hour and continue to elevate the legs. Rationale: decrease pain, increase ROM, and decrease edema  to improve the patients ability to  complete self care and related mobility                   With   [x] TE   [x] TA   [x] MT   [] SC   [] other: Patient Education: [x] Review HEP    [x] MLD Patient Education Continued education in self MLD technique with bathing and skin care.    [] Progressed/Changed HEP based on:   [] positioning   [] Kinesiotape   [] Skin care   [] wound care   [] other:   [x] Precautions. Patient / caregiver re-demonstrated bandaging. [] Yes  [] No  Compression bandaging/garment precautions reviewed: [] Yes  [] No         Pain Level (0-10 scale) post treatment: 0/10      ASSESSMENT/Changes in Function:   Pt arrived to clinic today highly motivated to begin MLB for both legs. Order for second set of bandages placed today and initial set pulled from clinic, Pt stated he wants to get swelling under control as fast as possible and he is happy to purchase whatever bandages are recommended to get started. He tolerated MLD and MLB well today, requiring additional cast padding on LLE to even out area around additional swelling/possible lipoma in ankle. Pt brought his own post op shoe for LLE but decided to provide with additional from clinic for RLE d/t difficulty donning shoe over bandages. Pt educated extensively in bandage precautions and care, Pt informed it is okay to remove bandages to shower just prior to his next appointment but he prefers to hold off and have clinicians remove bandages the first time so he can observe process and make sure he is doing everything correctly. He was informed to keep bandages dry in the meantime and to cover w/plastic and water proof tape if he chooses to shower before returning. Patient will continue to benefit from skilled OT services to  address functional mobility deficits, address ROM deficits, address swelling, analyze and address soft tissue restrictions, analyze compression product fit and use, instruct in home lymphedema management program, measure for compression products, and modify and progress therapeutic interventions to attain remaining goals.      [x]  See Plan of Care  []  See progress note/recertification  []  See Discharge Summary         Progress towards goals / Updated goals:  Short term goals  Time frame: 12 visits  Patient will independently identify at least 5 practices of proper skin care/infection prevention over 3 consecutive sessions to reduce risk of infections and wounds in 12 visits. ongoing  Patient will demonstrate independence in lymphedema home program of therapeutic exercises and self MLD over 3 consecutive sessions to improve circulation and decongest limb and for increased independence/tolerance for functional activities within 12 visits. ongoing  Patient will demonstrate decreased volumetric measurements by 500 in each LE in order to reduce risk for infection, and increased independence/tolerance for functional activity completion within 12 visits. ongoing     Long term goals  Time frame: 24 visits  Patient will demonstrate improved edema management evidenced by performing donning/doffing of garments from dependent to modified independent 3/3 times within session to aid in reducing risk for infection in 24 visits   2. Patient will demonstrate decrease in self-perceived functional impairment as evidenced by improved score on LLIS outcome measure from 56% impairment to 25% impairment within 24 visits. ongoing     3. Patient will demonstrate stable limb volumes of B lower extremities with no more than 400 mL increase/decrease over 3 visits to increase patient's ability to safely transition to home maintenance phase of CDT within 24 visits.   ongoing    PLAN  []  Upgrade activities as tolerated     [x]  Continue plan of care  []  Update interventions per flow sheet       []  Discharge due to:_  []  Other:_      Javier Garzon, OT 3/21/2023

## 2023-03-24 ENCOUNTER — HOSPITAL ENCOUNTER (OUTPATIENT)
Dept: PHYSICAL THERAPY | Age: 78
Discharge: HOME | End: 2023-03-24
Payer: MEDICARE

## 2023-03-24 PROCEDURE — 97140 MANUAL THERAPY 1/> REGIONS: CPT

## 2023-03-24 PROCEDURE — 97530 THERAPEUTIC ACTIVITIES: CPT

## 2023-03-24 NOTE — PROGRESS NOTES
LYMPHEDEMA OT DAILY TREATMENT NOTE - [facility] -15    Patient Name: Rebecca Cons  Date:3/24/2023  : 1945  [x]  Patient  Verified  Payor: Jenniffer Claros / Plan: [place] / Product Type: Medicare /    In LTET:4013 Out time:1045  Total Treatment Time (min): 60  Total Timed Codes (min): 60  1:1 Treatment Time (MC only): 60  Visit #:  3    Treatment Area: Lymphedema, not elsewhere classified [I89.0]    SUBJECTIVE  Pain Level (0-10 scale): 0/10  Any medication changes, allergies to medications, adverse drug reactions, diagnosis change, or new procedure performed?: [x] No    [] Yes (see summary sheet for update)  Subjective functional status/changes:   [] No changes reported  Patient arrived w/bandages in place. He reported that he has some areas of irritation around his ankles and upper calf    OBJECTIVE     min Therapeutic Exercise:  [] Isis Liao Exercises [x] Remedial Lymphedema Exercise Program [] Axillary Web Exercise Program      [] Shoulder ROM Exercises    Reviewed BLE exercises including diaphragmatic breathing 5x pre and post, seated march, knee flexion/extension, ankle pumps, ankle rotation, toe scrunches, toe abduction 10x each leg w/compression on to facilitate reduced swelling in BLE   Rationale: Activate muscle pump to improve lymphatic fluid movement and decrease swelling to improve the patients ability to perform ADL and IADL skills and prevent worsening of swelling over time. 30  30 min Manual Therapy  Therapeutic Activity:      Kinesiotaping: Deferred        Manual Lymphatic Drainage (MLD):  Area to decongest: LE: bilateral foot ankle calf   Sequence used and effectiveness: Modifications were made to manual lymph drainage sequence to exclude cervical techniques secondary to patient's age. Secondary sequence for lower extremities without trunk involvement.   Diaphragmatic breaths 5x pre and post MLD, Opened B IA anastomoses, Inguinal ln, femoral triangle, full leg sequence BLE, reworked Inguinal ln and BLE    Skin/wound care/debridement: Reviewed skin care principles:   Performed skin care with low pH lotion following manual lymph principles. Skin care products  Hygiene  Prevention of cellulitis   Applied multi-layer compression bandaging to: Patient arrived without adequate bandage in place that was applied by caregiver. bilateral  lower extremity: below knee    The following multi-layer bandages were applied:  Protouch Stockinette    Padding layer:  Fleece  Cellona added to L foot and ankle to shape out around areas of increased swelling    Foam:  10 cm roll Comprifoam  Added Velfoam around ankle to build up around seroma, abnormal ankle shape    Short stretch bandages:   6 cm  8 cm  10 cm    Proximal lower legs covered w/size H tubigrip and distal lower legs covered w/knee high hose stocking to prevent shearing. Pt brought his own post op shoe for LLE but given additional for RLE for appropriate fit and reduced fall risk     Pt educated in bandage precautions and given handout to review as follows: When To Take the Bandages OFF:   If toes are cold, and look blue. If the bandages become wet or soiled  If you become short of breath (while at rest), remove bandages and call your MD/911. If you have any pain, numbness or tingling that is not resolved by movement or change in position  DO NOT:   Stick any sharp things down into the bandage to itch.pat the bandage. DO:   Exercises daily 2x/day: Deep breathing; ankle pumps, toe curls, ankle circles, leg kicks, and marching seated in a chair. Move about every hour and continue to elevate the legs.             Rationale: decrease pain, increase ROM, and decrease edema  to improve the patients ability to  complete self care and related mobility                   With   [x] TE   [x] TA   [x] MT   [] SC   [] other: Patient Education: [x] Review HEP    [x] MLD Patient Education Continued education in self MLD technique with bathing and skin care. [] Progressed/Changed HEP based on:   [] positioning   [] Kinesiotape   [] Skin care   [] wound care   [] other:   [x] Precautions. Patient / caregiver re-demonstrated bandaging. [] Yes  [] No  Compression bandaging/garment precautions reviewed: [] Yes  [] No         Pain Level (0-10 scale) post treatment: 0/10      ASSESSMENT/Changes in Function:   Pt arrived for appointment today w/bandages in place. Legs are visibly reduced, but red areas of irritation proximal anterior calf and ankle. L ankle has abnormal shape d/t possible seroma, increased inflammation d/t h/o ankle fx and multiple surgeries. Velfoam and cellona added over area to achieve uniform shape and prevent skin breakdown. Pt continues to tolerate CDT well, plan to re-check volumes at next visit. Patient will continue to benefit from skilled OT services to  address functional mobility deficits, address ROM deficits, address swelling, analyze and address soft tissue restrictions, analyze compression product fit and use, instruct in home lymphedema management program, measure for compression products, and modify and progress therapeutic interventions to attain remaining goals. [x]  See Plan of Care  []  See progress note/recertification  []  See Discharge Summary         Progress towards goals / Updated goals:  Short term goals  Time frame: 12 visits  Patient will independently identify at least 5 practices of proper skin care/infection prevention over 3 consecutive sessions to reduce risk of infections and wounds in 12 visits. ongoing  Patient will demonstrate independence in lymphedema home program of therapeutic exercises and self MLD over 3 consecutive sessions to improve circulation and decongest limb and for increased independence/tolerance for functional activities within 12 visits. ongoing  Patient will demonstrate decreased volumetric measurements by 500 in each LE in order to reduce risk for infection, and increased independence/tolerance for functional activity completion within 12 visits. ongoing     Long term goals  Time frame: 24 visits  Patient will demonstrate improved edema management evidenced by performing donning/doffing of garments from dependent to modified independent 3/3 times within session to aid in reducing risk for infection in 24 visits   2. Patient will demonstrate decrease in self-perceived functional impairment as evidenced by improved score on LLIS outcome measure from 56% impairment to 25% impairment within 24 visits. ongoing     3. Patient will demonstrate stable limb volumes of B lower extremities with no more than 400 mL increase/decrease over 3 visits to increase patient's ability to safely transition to home maintenance phase of CDT within 24 visits.   ongoing    PLAN  []  Upgrade activities as tolerated     [x]  Continue plan of care  []  Update interventions per flow sheet       []  Discharge due to:_  []  Other:_      Tyler Bustamante OT 3/24/2023

## 2023-03-27 ENCOUNTER — HOSPITAL ENCOUNTER (OUTPATIENT)
Dept: PHYSICAL THERAPY | Age: 78
Discharge: HOME | End: 2023-03-27
Payer: MEDICARE

## 2023-03-27 PROCEDURE — 97161 PT EVAL LOW COMPLEX 20 MIN: CPT

## 2023-03-27 PROCEDURE — 97140 MANUAL THERAPY 1/> REGIONS: CPT

## 2023-03-27 PROCEDURE — 97530 THERAPEUTIC ACTIVITIES: CPT

## 2023-03-27 NOTE — THERAPY EVALUATION
[address] [address]      INITIAL EVALUATION    NAME: Yin Dyer AGE: 78 y.o. GENDER: male  DATE: 3/27/2023  REFERRING PHYSICIAN: Nickie Pino MD  HISTORY AND BACKGROUND: Patient arrives today with continued reports of swelling in the BLES. He also reports anterior lower leg skin irritation. He notes it may be related to the materials used for bandaging. He notes he has seen improvements and would like to continued with treatment. From previous OT evaluation 3/15/23: Mr. Ivette Meyers presents to the lymphedema clinic today w/B lower leg swelling that began 1 year ago after L total knee replacement and became progressively worse in the last 6 months. He has been assessed by primary care, cardiologist and now orthopedic surgeon who has referred him to lymphedema clinic. Patient is now having to ambulate w/a cane d/t mobility deficits resulting from swelling. PMH is significant for high cholesterol, HTN, A fib, Hypothyroidism, L total knee replacement, L ankle fracture w/revision surgery required 2000   Primary Diagnosis:  Lymphedema, not elsewhere classified (I89.0)  Other Treatment Diagnoses:  Abnormality of gait (R26.9)  Swelling not relieved by elevation (R60.9)    Date of Onset: 1 year ago  Present Symptoms and Functional Limitations: Patient presenting with increased difficulty with ambulation, difficulty     Lymphedema Life Impact Scale (LLIS): 38/68 or 56% impairment.   Past Medical History:   Past Medical History:   Diagnosis Date    Anticoagulated     Xarelto    COVID-19 vaccine series completed 04/15/2021    Moderna    Diverticulosis     HTN (hypertension)     Hyperlipemia     Hypothyroidism     Osteoarthritis     Paroxysmal A-fib ([facility] [address]     Poor historian      Past Surgical History:   Procedure Laterality Date    HX ANKLE FRACTURE TX Left 2000 HX LUMBAR FUSION      HX ORTHOPAEDIC Left 06/30/2021    Removal of hardware     Current Medications:    Current Outpatient Medications   Medication Sig    naloxone (NARCAN) 4 mg/actuation nasal spray Use 1 spray intranasally, then discard. Repeat with new spray every 2 min as needed for opioid overdose symptoms, alternating nostrils. omeprazole (PRILOSEC) 20 mg capsule Take 20 mg by mouth daily. ALPRAZolam (Xanax) 0.25 mg tablet Take 0.25 mg by mouth two (2) times daily as needed for Anxiety. ZINC PO Take 1 Tablet by mouth daily. cholecalciferol, vitamin D3, (VITAMIN D3 PO) Take 5,000 Units by mouth daily. vitamin E acetate (VITAMIN E PO) Take 1 Tablet by mouth daily. cetirizine HCl (ZYRTEC PO) Take 1 Tablet by mouth daily. levothyroxine (Synthroid) 175 mcg tablet Take 175 mcg by mouth every other day. levothyroxine (Synthroid) 150 mcg tablet Take 150 mcg by mouth every other day. olmesartan (Benicar) 40 mg tablet Take 40 mg by mouth daily. triamterene-hydroCHLOROthiazide (MAXZIDE) 37.5-25 mg per tablet Take 1 Tablet by mouth daily. hydrALAZINE (APRESOLINE) 25 mg tablet Take 50 mg by mouth nightly. dilTIAZem ER (DILACOR XR) 120 mg capsule Take 360 mg by mouth daily. OTHER Take 1 Tablet by mouth daily. Leternano Zeaxanthin + Saw Palmetto    amLODIPine (Norvasc) 10 mg tablet Take 10 mg by mouth daily. rivaroxaban (Xarelto) 20 mg tab tablet Take 20 mg by mouth daily. No current facility-administered medications for this encounter. Allergies: Allergies   Allergen Reactions    Valium [Diazepam] Other (comments)     Made him overstimulated- hit the doctor        Prior Level of Function/Social/Work History/Personal factors and/or comorbidities impacting plan of care: Patient retired .   Living Situation: Lives with spouse   Trainable Caregiver?: Yes  Mobility: Assistive device: straight cane modified independent  Sleeping Arrangement:  in bed at night  Adaptive Equipment Owned: cane: straight  Other: Patient is able to don compression stockings and does have assistance spouse. Community Resources Addressed (if applicable): Yes    Previous Therapy:  Occupational therapy to address edema. Compression/Lymphedema Equipment: none    SUBJECTIVE: Patient reporting he needs a R TKA and needs his edema to be controlled. Patients goals for therapy: decrease swelling and to be measured for new compression garments. OBJECTIVE DATA SUMMARY:   EXAMINATION/PRESENTATION/DECISION MAKING:   Pain:denies presence of pain at this time       Self-Care and ADLs: modified independent    Skin and Tissue Assessment:  Dermal Status: Tenuous, Wound/lesion present, and Dry    Texture/Consistency: Boggy     Pigmentation/Color Change: Hyperpigmented and Erythematous    Anomalies: N/A    Circulatory: Vascular studies ruled out DVT in past    Nails: Fungus and Discolored    Stemmers Sign: Negative      Bilateral Anterior Lower Leg     R Lower Leg     L Lower Leg  Height:   6'1\"  Weight:   272 lbs   BMI:     (36 or greater: adversely affecting lymphedema)  Wound/Ulcer:  hyperpigmented areas anteriorly lower leg. Surface irritation        Volumetric Measurements:   Volumes:  Date Right (mL) Left (mL) Volume difference %  Difference   3/27/23 4846.36 4589.99 256.37 5.59   3/15/23 5146.45 4643.03 503.42 10.84       Range of Motion: [facility] for bilateral lower extremities  Strength: Not formally assessed 2* patient is able to complete all functional activities in the clinic today.   Sensation:  WFL    Mobility:    Bed/Chair Mobility: Modified independent  Transfers: Modified independent  Gait: Modified independent  Endurance: intact  Other:     Safety:  Patient is alert and oriented: x4  Safety awareness: intact  Fall risk?: NO  Patient given written fall prevention handout: Yes       Physical Therapy Evaluation Charge Determination   History Examination Presentation Decision-Making   MEDIUM Complexity : 1-2 comorbidities / personal factors will impact the outcome/ POC  LOW Complexity : 1-2 Standardized tests and measures addressing body structure, function, activity limitation and / or participation in recreation  LOW Complexity : Stable, uncomplicated  Other outcome measures LLIS  LOW       Based on the above components, the patient evaluation is determined to be of the following complexity level: LOW     Evaluation Time: 9575-2497 13 minutes    TREATMENT PROVIDED:   Manual Therapy:   Time: 42 minutes  Manual Lymphatic Drainage (MLD):  Area to decongest: LE: bilateral foot ankle calf   Sequence used and effectiveness: Modifications were made to manual lymph drainage sequence to exclude cervical techniques secondary to patient's age. Secondary sequence for lower extremities without trunk involvement. Diaphragmatic breaths 5x pre and post MLD, Opened B IA anastomoses, full leg sequence BLE, reworked Inguinal ln and BLE    Skin/wound care/debridement: Reviewed skin care principles:   Performed skin care with low pH lotion following manual lymph principles. Skin care products  Hygiene  Prevention of cellulitis  Eucerin  Optifoam Transfer to reddened areas for padding and reduced friction to skin. Reassess response next session   Therapeutic Activity  Time: 41 minutes     Applied multi-layer compression bandaging to: Patient arrived without adequate bandage in place that was applied by caregiver.   bilateral  lower extremity: below knee     The following multi-layer bandages were applied:  Protouch Stockinette     Padding layer:  Fleece  Cellona added to L foot and ankle to shape out around areas of increased swelling  Cellona above ankle as well for padding to reduce friction to skin     Foam:  Added Velfoam around ankle to build up around seroma, abnormal ankle shape     Short stretch bandages:   6 cm  8 cm  10 cm     Proximal lower legs covered w/size H tubigrip and distal lower legs covered w/knee high hose stocking to prevent shearing. Pt brought his own post op shoe for LLE but given additional for RLE for appropriate fit and reduced fall risk      Pt educated in bandage precautions and given handout to review as follows: When To Take the Bandages OFF:   If toes are cold, and look blue. If the bandages become wet or soiled  If you become short of breath (while at rest), remove bandages and call your MD/911. If you have any pain, numbness or tingling that is not resolved by movement or change in position  DO NOT:   Stick any sharp things down into the bandage to itch.pat the bandage. DO:   Exercises daily 2x/day: Deep breathing; ankle pumps, toe curls, ankle circles, leg kicks, and marching seated in a chair. Move about every hour and continue to elevate the legs. Pain Level: 0/10  ASSESSMENT:   Radames Buchanan is a 78 y.o. male who presents with s/s consistent with stage II lymphedema of his LE: bilateral foot ankle calf. Patient is highly motivated to improve his condition and is seeking to expand his compression to include knee high compression stockings and wraps, and night time garments. Patient's condition is complicated by co-morbidities, limited mobility with reliance on AD. Patient will benefit from complete decongestive therapy (CDT) including: Manual lymphatic drainage (MLD) technique, Short-stretch textile bandages/compression system to decongest limb, and Kinesiotaping as appropriate. Patient's swelling is currently mild and @he@ would greatly benefit from a compression garment. This care is medically necessary due to the infection risk with lymphedema and to improve functional activities. CDT is necessary to resolve swelling to allow patient to return to wearing normal clothes/footwear and prevent worsening of symptoms, such as infections and/or hospitalizations.  Patient will be independent with home program strategies to allow improved ADL ability and mobility and to allow patient to return to greatest functional independence. Rehabilitation potential is considered to be Good. Factors which may influence rehabilitation potential include motivation, strong family support. Patient will benefit from 12 physical therapy visits over 12 weeks to optimize improvement in these areas. PLAN OF CARE:   Recommendations and Planned Interventions: Manual lymph drainage/decongestive therapy, Multi-layer compression bandaging (short-stretch), Compression garment fitting/provision, Lymphedema therapeutic exercise, AROM/PROM/Strength/Coordination, Self-care training, Functional mobility training, Education in skin care and lymphedema precautions, Self-MLD education per home program, Self-bandaging education per home program, Caregiver education as needed, and Would care as needed    GOALS  Short term goals  Time frame: 6 weeks  1. Patient will demonstrate knowledge of 3/3 signs/symptoms of infections/cellulitis and be independent in skin care to prevent cellulitis. 2.  Patient will demonstrate independence in lymphedema home program of 5/5 therapeutic exercises to improve circulation and decongest limb to improve ADLs. 3.  Patient will tolerate multi-layer bandages (MLB) and show measureable decrease in limb volume  of 750 ml B to allow ordering of home compression system (daytime, nighttime garments and pump as needed). Long term goals  Time frame: 12 weeks  1. Pt will show improvement in the LLIE by decreasing the score to 25/68 and thus allow improvement in patient's quality of life. 2.  Patient will be independent with don/doff of compression system and use in order to prevent reaccumulation of fluid at discharge. 3.  Pt will be independent in self-MLD and show stable limb volumes showing decongestion and pt. ready for transition to independent restorative phase of lymphedema therapy.      Patient has participated in goal setting and agrees to work toward plan of care.  Patient was instructed to call if questions or concerns arise. Thank you for this referral.  Ignacio Styles, PT, DPT, CLT       TREATMENT PLAN EFFECTIVE DATES:   3/27/2023 TO 7/19/23  I have read the above plan of care for Efrain Carpio. I certify the above prescribed services are required by this patient and are medically necessary.   The above plan of care has been developed in conjunction with the lymphedema/physical therapist.       Physician Signature: ____________________________________Date:______________

## 2023-03-30 ENCOUNTER — HOSPITAL ENCOUNTER (OUTPATIENT)
Dept: PHYSICAL THERAPY | Age: 78
End: 2023-03-30
Payer: MEDICARE

## 2023-03-30 PROCEDURE — 97140 MANUAL THERAPY 1/> REGIONS: CPT

## 2023-03-30 NOTE — PROGRESS NOTES
LYMPHEDEMA PT DAILY TREATMENT NOTE - [facility] 2-15    Patient Name: Hany Miguel  Date:3/30/2023  : 1945  [x]  Patient  Verified  Payor: Suzanne Giovanime / Plan: [place] / Product Type: Medicare /    In NELLY:[id number]  Out time:1024  Total Treatment Time (min): 32  Total Timed Codes (min): 32  1:1 Treatment Time ( [address] only): 32   Visit #: 2     Progress Note Due:   Re-certification Due: 3/96/43    Treatment Area: Lymphedema, not elsewhere classified [I89.0]    SUBJECTIVE  Pain Level (0-10 scale): 0/10  Any medication changes, allergies to medications, adverse drug reactions, diagnosis change, or new procedure performed?: [x] No    [] Yes (see summary sheet for update)  Subjective functional status/changes:   [x] No changes reported  Patient notes he can tell his bruises are better since changing the wrapping method. He would like to continue without the foam. Patient agreeable to treatment. OBJECTIVE    32 min Manual Therapy    Manual Lymphatic Drainage (MLD):  Area to decongest: LE: bilateral foot ankle calf   Sequence used and effectiveness: Modifications were made to manual lymph drainage sequence to exclude cervical techniques secondary to patient's age. Secondary sequence for lower extremities without trunk involvement. Diaphragmatic breaths 5x pre and post MLD, Opened B IA anastomoses, full leg sequence BLE, reworked Inguinal ln and BLE    Skin/wound care/debridement: Reviewed skin care principles:   Performed skin care with low pH lotion following manual lymph principles. Skin care products  Hygiene  Prevention of cellulitis  Eucerin  Optifoam Lite to reddened areas for padding and reduced friction to skin. Reassess response next session        Applied multi-layer compression bandaging to: Patient arrived without adequate bandage in place that was applied by caregiver.   bilateral  lower extremity: below knee     The following multi-layer bandages were applied:  Protouch Stockinette Padding layer:  Fleece to lower leg  Cellona Foot and Ankle       Foam:  Added Velfoam around ankle to build up around seroma, abnormal ankle shape     Short stretch bandages:   6 cm  8 cm  10 cm     Pt educated in bandage precautions and given handout to review as follows: When To Take the Bandages OFF:   If toes are cold, and look blue. If the bandages become wet or soiled  If you become short of breath (while at rest), remove bandages and call your MD/911. If you have any pain, numbness or tingling that is not resolved by movement or change in position  DO NOT:   Stick any sharp things down into the bandage to itch.pat the bandage. DO:   Exercises daily 2x/day: Deep breathing; ankle pumps, toe curls, ankle circles, leg kicks, and marching seated in a chair. Move about every hour and continue to elevate the legs. Rationale: increase ROM and decrease edema  to improve the patients ability to increased tolerance for functional activity completion. With   [] TE   [] TA   [x] MT   [] SC   [] other: Patient Education: [] Review HEP    [x] MLD Patient Education Continued education in self MLD technique with bathing and skin care. [] Progressed/Changed HEP based on:   [] positioning   [] Kinesiotape   [] Skin care   [] wound care   [] other:   [x] Precautions. Patient / caregiver re-demonstrated bandaging. [] Yes  [x] No  Compression bandaging/garment precautions reviewed: [x] Yes  [] No     Other Objective/Functional Measures:   Lymphedema Life Impact Scale (LLIS): 38/68 or 56% impairment   Height:     Weight:      BMI:     (36 or greater: adversely affecting lymphedema)    Volumetric Measurements:   Volumes:  Date Right (mL) Left (mL) Volume difference %  Difference   3/27/23 4846.36 4589.99 256.37 5.59   3/15/23 5146.45 4643.03 503.42 10.84         Pain Level (0-10 scale) post treatment: 0/10      ASSESSMENT/Changes in Function:   Reddened areas improved.  Patient with subjective report of improved discomfort at redenned areas. Visual reduction note between sessions. Recommend continued treatment to effectively address edema, and promote transition to maintenance utilizing compression garments, exercise, and skin care. Continued skilled therapy is medically necessary in order to promote return to PLOF. Patient will continue to benefit from skilled PT services to  address functional mobility deficits, address ROM deficits, address swelling, analyze compression product fit and use, instruct in home lymphedema management program, measure for compression products, and modify and progress therapeutic interventions to attain remaining goals. [x]  See Plan of Care  []  See progress note/recertification  []  See Discharge Summary         Progress towards goals / Updated goals:  GOALS  Short term goals  Time frame: 6 weeks  1. Patient will demonstrate knowledge of 3/3 signs/symptoms of infections/cellulitis and be independent in skin care to prevent cellulitis. 2.  Patient will demonstrate independence in lymphedema home program of 5/5 therapeutic exercises to improve circulation and decongest limb to improve ADLs. 3.  Patient will tolerate multi-layer bandages (MLB) and show measureable decrease in limb volume  of 750 ml B to allow ordering of home compression system (daytime, nighttime garments and pump as needed). Long term goals  Time frame: 12 weeks  1. Pt will show improvement in the LLIE by decreasing the score to 25/68 and thus allow improvement in patient's quality of life. 2.  Patient will be independent with don/doff of compression system and use in order to prevent reaccumulation of fluid at discharge. 3.  Pt will be independent in self-MLD and show stable limb volumes showing decongestion and pt. ready for transition to independent restorative phase of lymphedema therapy.     PLAN  []  Upgrade activities as tolerated     [x]  Continue plan of care  []  Update interventions per flow sheet       []  Discharge due to:_  []  Other:_      Ed Strong, PT, DPT, CLT  3/30/2023

## 2023-04-04 ENCOUNTER — HOSPITAL ENCOUNTER (OUTPATIENT)
Dept: PHYSICAL THERAPY | Age: 78
End: 2023-04-04
Payer: COMMERCIAL

## 2023-04-04 PROCEDURE — 97530 THERAPEUTIC ACTIVITIES: CPT

## 2023-04-04 PROCEDURE — 97140 MANUAL THERAPY 1/> REGIONS: CPT

## 2023-04-04 NOTE — PROGRESS NOTES
LYMPHEDEMA OT DAILY TREATMENT NOTE - [facility] 2-15    Patient Name: Celina Carrasco  Date:2023  : 1945  [x]  Patient  Verified  Payor: BLUE CROSS / Plan: [employer] [facility] / Product Type: PPO /    In OMOD:4256  Out time:1035  Total Treatment Time (min): 50  Total Timed Codes (min): 50  1:1 Treatment Time ([facility] only): 50  Visit #: 4      Treatment Area: Lymphedema, not elsewhere classified [I89.0]    SUBJECTIVE  Pain Level (0-10 scale): 0/10  Any medication changes, allergies to medications, adverse drug reactions, diagnosis change, or new procedure performed?: [x] No    [] Yes (see summary sheet for update)  Subjective functional status/changes:   [x] No changes reported  Patient's bruises are resolving as well as pocket of fluid in L ankle. Pt would like to continue w/bandaging and padding changes     OBJECTIVE    40 min Manual Therapy    Manual Lymphatic Drainage (MLD):  Area to decongest: LE: bilateral foot ankle calf   Sequence used and effectiveness: Modifications were made to manual lymph drainage sequence to exclude cervical techniques secondary to patient's age. Secondary sequence for lower extremities without trunk involvement. Diaphragmatic breaths 5x pre and post MLD, Opened B IA anastomoses, full leg sequence BLE, reworked Inguinal ln and BLE    Skin/wound care/debridement: Reviewed skin care principles:   Performed skin care with low pH lotion following manual lymph principles. Skin care products  Hygiene  Prevention of cellulitis  Eucerin  Optifoam Lite to reddened areas for padding and reduced friction to skin. Reassess response next session        Applied multi-layer compression bandaging to: Patient arrived without adequate bandage in place that was applied by caregiver.   bilateral  lower extremity: below knee     The following multi-layer bandages were applied:  Protouch Stockinette     Padding layer:  Fleece to lower leg  Cellona Foot and Ankle, extra cellona around L ankle to achieve uniform shape       Foam:  Deferred to reduce friction and bruising      Short stretch bandages:   6 cm  8 cm  10 cm     Pt educated in bandage precautions and given handout to review as follows: When To Take the Bandages OFF:   If toes are cold, and look blue. If the bandages become wet or soiled  If you become short of breath (while at rest), remove bandages and call your MD/911. If you have any pain, numbness or tingling that is not resolved by movement or change in position  DO NOT:   Stick any sharp things down into the bandage to itch.pat the bandage. DO:   Exercises daily 2x/day: Deep breathing; ankle pumps, toe curls, ankle circles, leg kicks, and marching seated in a chair. Move about every hour and continue to elevate the legs. Rationale: increase ROM and decrease edema  to improve the patients ability to increased tolerance for functional activity completion. 10 min Therapeutic Activity: Reviewed self MLD program w/Pt including diaphragmatic breathing 5x pre and post, stimulating proximal lymph nodes (Cervical, Axillary, Inguinal), clearing IAA, upper leg and popliteal area. Pt able to teach back good technique   Rationale: Activate muscle pump to improve lymphatic fluid movement and decrease swelling to improve the patients ability to perform ADL and IADL skills and prevent worsening of swelling over time. With   [] TE   [] TA   [x] MT   [] SC   [] other: Patient Education: [] Review HEP    [x] MLD Patient Education Continued education in self MLD technique with bathing and skin care. [] Progressed/Changed HEP based on:   [] positioning   [] Kinesiotape   [] Skin care   [] wound care   [] other:   [x] Precautions. Patient / caregiver re-demonstrated bandaging.  [] Yes  [x] No  Compression bandaging/garment precautions reviewed: [x] Yes  [] No     Other Objective/Functional Measures:   Volumetric Measurements:   Volumes:  Date Right (mL) Left (mL) Volume difference %  Difference   3/27/23 4846.36 4589.99 256.37 5.59   3/15/23 5146.45 4643.03 503.42 10.84         Pain Level (0-10 scale) post treatment: 0/10      ASSESSMENT/Changes in Function:   Reddened areas improved. Patient with subjective report of improved discomfort at redenned areas. Visual reduction note between sessions. Recommend continued treatment to effectively address edema, and promote transition to maintenance utilizing compression garments, exercise, and skin care. Continued skilled therapy is medically necessary in order to promote return to PLOF. Patient will continue to benefit from skilled OT services to  address functional mobility deficits, address ROM deficits, address swelling, analyze compression product fit and use, instruct in home lymphedema management program, measure for compression products, and modify and progress therapeutic interventions to attain remaining goals. [x]  See Plan of Care  []  See progress note/recertification  []  See Discharge Summary         Progress towards goals / Updated goals:  GOALS  Short term goals  Short term goals  Time frame: 12 visits  Patient will independently identify at least 5 practices of proper skin care/infection prevention over 3 consecutive sessions to reduce risk of infections and wounds in 12 visits. ongoing  Patient will demonstrate independence in lymphedema home program of therapeutic exercises and self MLD over 3 consecutive sessions to improve circulation and decongest limb and for increased independence/tolerance for functional activities within 12 visits. ongoing  Patient will demonstrate decreased volumetric measurements by 500 in each LE in order to reduce risk for infection, and increased independence/tolerance for functional activity completion within 12 visits.  ongoing     Long term goals  Time frame: 24 visits  Patient will demonstrate improved edema management evidenced by performing donning/doffing of garments from dependent to modified independent 3/3 times within session to aid in reducing risk for infection in 24 visits   2. Patient will demonstrate decrease in self-perceived functional impairment as evidenced by improved score on LLIS outcome measure from 56% impairment to 25% impairment within 24 visits. ongoing     3. Patient will demonstrate stable limb volumes of B lower extremities with no more than 400 mL increase/decrease over 3 visits to increase patient's ability to safely transition to home maintenance phase of CDT within 24 visits.   ongoing  PLAN  []  Upgrade activities as tolerated     [x]  Continue plan of care  []  Update interventions per flow sheet       []  Discharge due to:_  []  Other:_      Diya Serrano, OT, CLT 4/4/2023

## 2023-04-14 ENCOUNTER — HOSPITAL ENCOUNTER (OUTPATIENT)
Dept: PHYSICAL THERAPY | Age: 78
Discharge: HOME | End: 2023-04-14
Payer: COMMERCIAL

## 2023-04-14 PROCEDURE — 97140 MANUAL THERAPY 1/> REGIONS: CPT

## 2023-04-18 ENCOUNTER — HOSPITAL ENCOUNTER (OUTPATIENT)
Dept: PHYSICAL THERAPY | Age: 78
Discharge: HOME | End: 2023-04-18
Payer: COMMERCIAL

## 2023-04-18 PROCEDURE — 97140 MANUAL THERAPY 1/> REGIONS: CPT

## 2023-04-18 NOTE — PROGRESS NOTES
LYMPHEDEMA OT DAILY TREATMENT NOTE - [facility] 2-15    Patient Name: Antonia Calhoun  Date:2023  : 1945  [x]  Patient  Verified  Payor: BLUE CROSS / Plan: [employer] [facility] / Product Type: PPO /    In time: 935 Out time:103  Total Treatment Time (min):55  Total Timed Codes (min): 55  1:1 Treatment Time [facility] only):55  Visit #: 7      Treatment Area: Lymphedema, not elsewhere classified [I89.0]    SUBJECTIVE  Pain Level (0-10 scale): 0/10  Any medication changes, allergies to medications, adverse drug reactions, diagnosis change, or new procedure performed?: [x] No    [] Yes (see summary sheet for update)  Subjective functional status/changes:   [x] No changes reported  Pt arrives today w/bandages in place. Responding well to CDT, RLE visually reduced. OBJECTIVE          55 min Manual Therapy    Manual Lymphatic Drainage (MLD):  Area to decongest: LE: bilateral foot ankle calf   Sequence used and effectiveness: Modifications were made to manual lymph drainage sequence to exclude cervical techniques secondary to patient's age. Secondary sequence for lower extremities without trunk involvement. Diaphragmatic breaths 5x pre and post MLD, Opened B IA anastomoses, full leg sequence BLE, reworked Inguinal ln and BLE    Skin/wound care/debridement: Reviewed skin care principles:   Performed skin care with low pH lotion following manual lymph principles. Skin care products  Hygiene  Prevention of cellulitis  Eucerin  Optifoam Lite to reddened areas for padding and reduced friction to skin. Reassess response next session        Applied multi-layer compression bandaging to: Patient arrived without adequate bandage in place that was applied by caregiver.   bilateral  lower extremity: below knee     The following multi-layer bandages were applied:  Protouch Stockinette     Padding layer:  Fleece to lower leg  Cellona Foot and Ankle, extra cellona around L ankle to achieve uniform shape Foam:  Deferred to reduce friction and bruising      Short stretch bandages:   6 cm  8 cm  10 cm     Pt educated in bandage precautions and given handout to review as follows: When To Take the Bandages OFF:   If toes are cold, and look blue. If the bandages become wet or soiled  If you become short of breath (while at rest), remove bandages and call your MD/911. If you have any pain, numbness or tingling that is not resolved by movement or change in position  DO NOT:   Stick any sharp things down into the bandage to itch.pat the bandage. DO:   Exercises daily 2x/day: Deep breathing; ankle pumps, toe curls, ankle circles, leg kicks, and marching seated in a chair. Move about every hour and continue to elevate the legs. Re-assessed volumes today and educated Pt in plan for measuring/ordering garments in the next week      Rationale: increase ROM and decrease edema  to improve the patients ability to increased tolerance for functional activity completion. With   [] TE   [] TA   [x] MT   [] SC   [] other: Patient Education: [] Review HEP    [x] MLD Patient Education Continued education in self MLD technique with bathing and skin care. [] Progressed/Changed HEP based on:   [] positioning   [] Kinesiotape   [] Skin care   [] wound care   [] other:   [x] Precautions. Patient / caregiver re-demonstrated bandaging. [] Yes  [x] No  Compression bandaging/garment precautions reviewed: [x] Yes  [] No     Other Objective/Functional Measures:   Volumetric Measurements:   Volumes:  Date Right (mL) Left (mL) Volume difference %  Difference   3/27/23 4846.36 4589.99 256.37 5.59   3/15/23 5146.45 4643.03 503.42        10.84   04/07/23 4528.96 4135.31 393.65         9.52   04/14/23 5146.45 4643.03 503.42         14.43         Pain Level (0-10 scale) post treatment: 0/10      ASSESSMENT/Changes in Function:   Pt continues to respond well to CDT, RLE appears to be reducing.   Pt is eager to be measured for garments and will likely be ready at second visit this week. Plan to re-check volumes at next visit and measure for knee highs to be submitted through insurance and continuing w/2x week bandaging until Pt has transitioned into garments. Patient will continue to benefit from skilled OT services to  address functional mobility deficits, address ROM deficits, address swelling, analyze compression product fit and use, instruct in home lymphedema management program, measure for compression products, and modify and progress therapeutic interventions to attain remaining goals. [x]  See Plan of Care  []  See progress note/recertification  []  See Discharge Summary         Progress towards goals / Updated goals:  GOALS  Short term goals  Short term goals  Time frame: 12 visits  Patient will independently identify at least 5 practices of proper skin care/infection prevention over 3 consecutive sessions to reduce risk of infections and wounds in 12 visits. ongoing  Patient will demonstrate independence in lymphedema home program of therapeutic exercises and self MLD over 3 consecutive sessions to improve circulation and decongest limb and for increased independence/tolerance for functional activities within 12 visits. ongoing  Patient will demonstrate decreased volumetric measurements by 500 in each LE in order to reduce risk for infection, and increased independence/tolerance for functional activity completion within 12 visits. ongoing     Long term goals  Time frame: 24 visits  Patient will demonstrate improved edema management evidenced by performing donning/doffing of garments from dependent to modified independent 3/3 times within session to aid in reducing risk for infection in 24 visits   2. Patient will demonstrate decrease in self-perceived functional impairment as evidenced by improved score on LLIS outcome measure from 56% impairment to 25% impairment within 24 visits. ongoing     3.  Patient will demonstrate stable limb volumes of B lower extremities with no more than 400 mL increase/decrease over 3 visits to increase patient's ability to safely transition to home maintenance phase of CDT within 24 visits.   ongoing  PLAN  []  Upgrade activities as tolerated     [x]  Continue plan of care  []  Update interventions per flow sheet       []  Discharge due to:_  []  Other:_      Michele Martins OT, CLT 4/18/2023

## 2023-04-21 ENCOUNTER — HOSPITAL ENCOUNTER (OUTPATIENT)
Dept: PHYSICAL THERAPY | Age: 78
Discharge: HOME | End: 2023-04-21
Payer: COMMERCIAL

## 2023-04-21 PROCEDURE — 97140 MANUAL THERAPY 1/> REGIONS: CPT

## 2023-04-21 PROCEDURE — 97760 ORTHOTIC MGMT&TRAING 1ST ENC: CPT

## 2023-04-25 ENCOUNTER — HOSPITAL ENCOUNTER (OUTPATIENT)
Dept: PHYSICAL THERAPY | Age: 78
Discharge: HOME | End: 2023-04-25
Payer: COMMERCIAL

## 2023-04-25 PROCEDURE — 97140 MANUAL THERAPY 1/> REGIONS: CPT

## 2023-04-25 NOTE — PROGRESS NOTES
LYMPHEDEMA OT DAILY TREATMENT NOTE - [facility] -15    Patient Name: Asya [place]  Date:2023  : 1945  [x]  Patient  Verified  Payor: BLUE CROSS / Plan:  [facility] / Product Type: PPO /    In time: 935 Out time:1030  Total Treatment Time (min):55  Total Timed Codes (min): 55  1:1 Treatment Time [facility] only):55  Visit #: 9      Treatment Area: Lymphedema, not elsewhere classified [I89.0]    SUBJECTIVE  Pain Level (0-10 scale): 0/10  Any medication changes, allergies to medications, adverse drug reactions, diagnosis change, or new procedure performed?: [x] No    [] Yes (see summary sheet for update)  Subjective functional status/changes:   [x] No changes reported  Pt arrives today w/bandages in place. Reports area of increased pressure and irritation on posterior L calf    OBJECTIVE        55 min Manual Therapy    Manual Lymphatic Drainage (MLD):  Area to decongest: LE: bilateral foot ankle calf   Sequence used and effectiveness: Modifications were made to manual lymph drainage sequence to exclude cervical techniques secondary to patient's age. Secondary sequence for lower extremities without trunk involvement. Diaphragmatic breaths 5x pre and post MLD, Opened B IA anastomoses, full leg sequence BLE, reworked Inguinal ln and BLE    Skin/wound care/debridement: Reviewed skin care principles:   Performed skin care with low pH lotion following manual lymph principles. Skin care products  Hygiene  Prevention of cellulitis  Eucerin  Optifoam Lite to reddened areas for padding and reduced friction to skin. Applied multi-layer compression bandaging to: Patient arrived without adequate bandage in place that was applied by caregiver. bilateral  lower extremity: below knee     The following multi-layer bandages were applied:  Protouch Stockinette     Padding layer:  Fleece to lower leg  Cellona Foot and Ankle, extra cellona around L ankle to achieve uniform shape.   Extra cellona around upper calf to reduce pressure on skin       Foam:  Deferred to reduce friction and bruising      Short stretch bandages:   6 cm  8 cm  10 cm     Pt educated in bandage precautions and given handout to review as follows: When To Take the Bandages OFF:   If toes are cold, and look blue. If the bandages become wet or soiled  If you become short of breath (while at rest), remove bandages and call your MD/911. If you have any pain, numbness or tingling that is not resolved by movement or change in position  DO NOT:   Stick any sharp things down into the bandage to itch.pat the bandage. DO:   Exercises daily 2x/day: Deep breathing; ankle pumps, toe curls, ankle circles, leg kicks, and marching seated in a chair. Move about every hour and continue to elevate the legs. Rationale: increase ROM and decrease edema  to improve the patients ability to increased tolerance for functional activity completion. With   [] TE   [] TA   [x] MT   [] SC   [] other: Patient Education: [] Review HEP    [x] MLD Patient Education Continued education in self MLD technique with bathing and skin care. [] Progressed/Changed HEP based on:   [] positioning   [] Kinesiotape   [] Skin care   [] wound care   [] other:   [x] Precautions. Patient / caregiver re-demonstrated bandaging. [] Yes  [x] No  Compression bandaging/garment precautions reviewed: [x] Yes  [] No     Other Objective/Functional Measures:   Volumetric Measurements:   Volumes:  Date Right (mL) Left (mL) Volume difference %  Difference   3/15/23 5146.45 4643.03 503.42 10.84   3/27/23 4846.36 4589.99 256.37        5.59   04/07/23 4528.96 4135.31 393.65         9.52   04/14/23 4639.27 4054.09 503.42         14.43   04/21/23 4365.46 4083.2 282.26          6.91       Pain Level (0-10 scale) post treatment: 0/10      ASSESSMENT/Changes in Function:   Pt continues to respond well to CDT w/visuall reduced BLE today.   Increased padding required at proximal L calf to reduce pressure and redness. Plan for Pt to continue w/2x week bandaging until garments arrive and are fit. Patient will continue to benefit from skilled OT services to  address functional mobility deficits, address ROM deficits, address swelling, analyze compression product fit and use, instruct in home lymphedema management program, measure for compression products, and modify and progress therapeutic interventions to attain remaining goals. [x]  See Plan of Care  []  See progress note/recertification  []  See Discharge Summary         Progress towards goals / Updated goals:  GOALS  Short term goals  Short term goals  Time frame: 12 visits  Patient will independently identify at least 5 practices of proper skin care/infection prevention over 3 consecutive sessions to reduce risk of infections and wounds in 12 visits. Goal met 04/21/2023  Patient will demonstrate independence in lymphedema home program of therapeutic exercises and self MLD over 3 consecutive sessions to improve circulation and decongest limb and for increased independence/tolerance for functional activities within 12 visits. ongoing  Patient will demonstrate decreased volumetric measurements by 500 in each LE in order to reduce risk for infection, and increased independence/tolerance for functional activity completion within 12 visits. Goal met 4/21/2023  Long term goals  Time frame: 24 visits  Patient will demonstrate improved edema management evidenced by performing donning/doffing of garments from dependent to modified independent 3/3 times within session to aid in reducing risk for infection in 24 visits  ongoing  2. Patient will demonstrate decrease in self-perceived functional impairment as evidenced by improved score on LLIS outcome measure from 56% impairment to 25% impairment within 24 visits. ongoing     3.  Patient will demonstrate stable limb volumes of B lower extremities with no more than 400 mL increase/decrease over 3 visits to increase patient's ability to safely transition to home maintenance phase of CDT within 24 visits.   ongoing  PLAN  []  Upgrade activities as tolerated     [x]  Continue plan of care  []  Update interventions per flow sheet       []  Discharge due to:_  []  Other:_      Charlie Ochoa, OT, CLT 4/25/2023

## 2023-04-27 ENCOUNTER — HOSPITAL ENCOUNTER (OUTPATIENT)
Dept: PHYSICAL THERAPY | Age: 78
Discharge: HOME | End: 2023-04-27
Payer: COMMERCIAL

## 2023-04-27 PROCEDURE — 97535 SELF CARE MNGMENT TRAINING: CPT

## 2023-04-27 PROCEDURE — 97140 MANUAL THERAPY 1/> REGIONS: CPT

## 2023-05-01 ENCOUNTER — APPOINTMENT (OUTPATIENT)
Facility: HOSPITAL | Age: 78
End: 2023-05-01
Payer: COMMERCIAL

## 2023-05-02 ENCOUNTER — HOSPITAL ENCOUNTER (OUTPATIENT)
Dept: PHYSICAL THERAPY | Age: 78
Discharge: HOME | End: 2023-05-02
Payer: COMMERCIAL

## 2023-05-02 PROCEDURE — 97140 MANUAL THERAPY 1/> REGIONS: CPT

## 2023-05-02 PROCEDURE — 97535 SELF CARE MNGMENT TRAINING: CPT

## 2023-05-02 PROCEDURE — 9990 CHARGE CONVERSION

## 2023-05-05 ENCOUNTER — HOSPITAL ENCOUNTER (OUTPATIENT)
Dept: PHYSICAL THERAPY | Age: 78
Discharge: HOME | End: 2023-05-05
Payer: COMMERCIAL

## 2023-05-05 PROCEDURE — 97535 SELF CARE MNGMENT TRAINING: CPT

## 2023-05-05 PROCEDURE — 9990 CHARGE CONVERSION

## 2023-05-05 PROCEDURE — 97140 MANUAL THERAPY 1/> REGIONS: CPT

## 2023-05-09 ENCOUNTER — HOSPITAL ENCOUNTER (OUTPATIENT)
Facility: HOSPITAL | Age: 78
Setting detail: RECURRING SERIES
Discharge: HOME | End: 2023-05-12
Payer: MEDICARE

## 2023-05-09 ENCOUNTER — APPOINTMENT (OUTPATIENT)
Dept: PHYSICAL THERAPY | Age: 78
End: 2023-05-09
Payer: COMMERCIAL

## 2023-05-09 PROCEDURE — 97535 SELF CARE MNGMENT TRAINING: CPT

## 2023-05-09 PROCEDURE — 97140 MANUAL THERAPY 1/> REGIONS: CPT

## 2023-05-12 ENCOUNTER — APPOINTMENT (OUTPATIENT)
Dept: PHYSICAL THERAPY | Age: 78
End: 2023-05-12
Payer: COMMERCIAL

## 2023-05-12 ENCOUNTER — HOSPITAL ENCOUNTER (OUTPATIENT)
Facility: HOSPITAL | Age: 78
Setting detail: RECURRING SERIES
Discharge: HOME | End: 2023-05-15
Payer: MEDICARE

## 2023-05-12 PROCEDURE — 97140 MANUAL THERAPY 1/> REGIONS: CPT

## 2023-05-12 PROCEDURE — 97535 SELF CARE MNGMENT TRAINING: CPT

## 2023-05-16 ENCOUNTER — APPOINTMENT (OUTPATIENT)
Dept: PHYSICAL THERAPY | Age: 78
End: 2023-05-16
Payer: COMMERCIAL

## 2023-05-16 ENCOUNTER — HOSPITAL ENCOUNTER (OUTPATIENT)
Facility: HOSPITAL | Age: 78
Setting detail: RECURRING SERIES
Discharge: HOME | End: 2023-05-19
Payer: MEDICARE

## 2023-05-16 PROCEDURE — 97140 MANUAL THERAPY 1/> REGIONS: CPT

## 2023-05-16 PROCEDURE — 97535 SELF CARE MNGMENT TRAINING: CPT

## 2023-05-16 NOTE — PROGRESS NOTES
[facility]   [facility] [address]  [address], [address]   [place], [address] [address]   Phone: [phone]  Fax: [phone]      OCCUPATIONAL THERAPY PROGRESS NOTE  Patient Name:  Peace Vyas :  1945   Treatment/Medical Diagnosis:  Lymphedema, not elsewhere classified [I89.0]   Referral Source:  Nav Oakley MD     Date of Initial Visit:  3/15/2023 Attended Visits:  15 Missed Visits:  0     SUMMARY OF TREATMENT/ASSESSMENT:  Patient has BLE swelling and has been seen for 15/24 visits for MLD and knee high MLB. He is responding well to CDT and progressing towards his goals. CURRENT STATUS  Patient is responding well to CDT and volumes are stable (see chart below). He has been measured for custom flat knit knee high stockings which have been approved by insurance and ordered through [employer].      Volumes:  Date Right (mL) Left (mL) Volume difference %  Difference   3/15/23 5146.45 4643.03 503.42 10.84   3/27/23 4846.36 4589.99 256.37        5.59   23 4528.96 4135.31 393.65         9.52   23 4639.27 4054.09 503.42         14.43   23 4365.46 4083.2 282.26          6.91   2023 4,585.59 4,280.87 304.72 7.12       Short term goals   Time frame: 12 visits    Patient will independently identify at least 5 practices of proper skin care/infection prevention over 3 consecutive sessions to reduce  risk of infections and wounds in 12 visits. Goal met 2023    Patient will demonstrate independence in lymphedema home program of therapeutic exercises and self MLD over 3 consecutive sessions to improve circulation and  decongest limb and for increased independence/tolerance for functional activities within 12 visits.  Goal met 2023    Patient will demonstrate decreased volumetric measurements by 500 in each LE in order to reduce risk for infection, and increased independence/tolerance  for functional activity completion within 12
maintenance phase of CDT within 24 visits.   ongoing      PLAN  Yes Continue plan of care  Re-Cert Due: 0/78/5206  []  Upgrade activities as tolerated  []  Discharge due to :  []  Other:      Jonatan Rincon OT       5/16/2023       10:48 AM

## 2023-05-19 ENCOUNTER — APPOINTMENT (OUTPATIENT)
Dept: PHYSICAL THERAPY | Age: 78
End: 2023-05-19
Payer: COMMERCIAL

## 2023-05-19 ENCOUNTER — HOSPITAL ENCOUNTER (OUTPATIENT)
Facility: HOSPITAL | Age: 78
Setting detail: RECURRING SERIES
Discharge: HOME | End: 2023-05-22
Payer: MEDICARE

## 2023-05-19 PROCEDURE — 97535 SELF CARE MNGMENT TRAINING: CPT

## 2023-05-19 PROCEDURE — 97140 MANUAL THERAPY 1/> REGIONS: CPT

## 2023-05-23 ENCOUNTER — APPOINTMENT (OUTPATIENT)
Dept: PHYSICAL THERAPY | Age: 78
End: 2023-05-23
Payer: COMMERCIAL

## 2023-05-23 ENCOUNTER — HOSPITAL ENCOUNTER (OUTPATIENT)
Facility: HOSPITAL | Age: 78
Setting detail: RECURRING SERIES
Discharge: HOME | End: 2023-05-26
Payer: MEDICARE

## 2023-05-23 PROCEDURE — 97535 SELF CARE MNGMENT TRAINING: CPT

## 2023-05-23 PROCEDURE — 97140 MANUAL THERAPY 1/> REGIONS: CPT

## 2023-05-23 NOTE — PROGRESS NOTES
OCCUPATIONAL THERAPY - DAILY TREATMENT NOTE (updated 3/23)      Date: 2023          Patient Name:  King Chau :  1945   Medical   Diagnosis:  Lymphedema, not elsewhere classified [I89.0] Treatment Diagnosis:  I89.0     Lymphedema, not elsewhere classified    Referral Source:  Codie Vuong MD Insurance:   Payor: MEDICARE / Plan: MEDICARE PART A / Product Type: *No Product type* /                     Patient  verified yes     Visit #   Current  / Total 16 24   Time   In / Out 0935 1025   Total Treatment Time 50   Total Timed Codes 50         SUBJECTIVE    Pain Level (0-10 scale): 0    Any medication changes, allergies to medications, adverse drug reactions, diagnosis change, or new procedure performed?: [x] No    [] Yes (see summary sheet for update)  Medications: Verified on Patient Summary List    Subjective functional status/changes:     Patient arrived on time w/bandages in place    OBJECTIVE      Therapeutic Procedures: Tx Min Billable or 1:1 Min (if diff from Tx Min) Procedure, Rationale, Specifics   40 40 99967 Manual Therapy (timed):  decrease pain, increase ROM, increase tissue extensibility, and decrease edema to improve patient's ability to progress to PLOF and address remaining functional goals. The manual therapy interventions were performed at a separate and distinct time from the therapeutic activities interventions . (see flow sheet as applicable)    Details if applicable:      Manual Lymphatic Drainage (MLD):   Area to decongest:  LE: bilateral foot ankle calf         Sequence used and effectiveness:  Modifications were made to manual lymph drainage sequence to exclude cervical techniques secondary to patient's age. Secondary sequence for lower extremities without trunk involvement.    Diaphragmatic breaths 5x pre and post MLD, Opened B IA anastomoses, full leg sequence BLE, reworked Inguinal ln and BLE                     Applied multi-layer compression bandaging to:

## 2023-05-26 ENCOUNTER — APPOINTMENT (OUTPATIENT)
Dept: PHYSICAL THERAPY | Age: 78
End: 2023-05-26
Payer: COMMERCIAL

## 2023-05-26 ENCOUNTER — HOSPITAL ENCOUNTER (OUTPATIENT)
Facility: HOSPITAL | Age: 78
Setting detail: RECURRING SERIES
Discharge: HOME | End: 2023-05-29
Payer: COMMERCIAL

## 2023-05-26 PROCEDURE — 97140 MANUAL THERAPY 1/> REGIONS: CPT

## 2023-05-30 ENCOUNTER — APPOINTMENT (OUTPATIENT)
Dept: PHYSICAL THERAPY | Age: 78
End: 2023-05-30
Payer: COMMERCIAL

## 2023-05-30 ENCOUNTER — HOSPITAL ENCOUNTER (OUTPATIENT)
Facility: HOSPITAL | Age: 78
Setting detail: RECURRING SERIES
Discharge: HOME | End: 2023-06-02
Payer: COMMERCIAL

## 2023-05-30 PROCEDURE — 97140 MANUAL THERAPY 1/> REGIONS: CPT

## 2023-05-30 PROCEDURE — 97535 SELF CARE MNGMENT TRAINING: CPT

## 2023-05-30 NOTE — PROGRESS NOTES
(if applicable):    [x]  intact    [x]  Redness-Pt had new tick bite on L knee. OT educated Pt in importance of avoiding bug bites to prevent infection, lymphedema flare up. Discussed wearing protective clothing and perforimng skin checks daily            []redness - adverse reaction:          Patient Education: [x] Review HEP    [x]  Patient Education billed concurrently with other procedures   [x] MLD Patient Education Reviewed with patient, as well as demonstration and instruction during MLD portion of session, Continued education in self MLD technique with bathing and skin care, and Provided patient with the written instructions to follow  [] Progressed/Changed HEP based on:   [] positioning   [] Kinesiotape   [] Skin care   [] wound care   [] other:   [] Patient/caregiver in multi-layer bandaging donning principles. and Precautions. Patient / caregiver re-demonstrated bandaging. [] Yes  [] No  Compression bandaging/garment precautions reviewed: [] Yes  [] No      Other Objective/Functional Measures  Volumetric Measurements:   Volumes:  Date Right (mL) Left (mL) Volume difference %  Difference   3/15/23 5146.45 4643.03 503.42 10.84   3/27/23 4846.36 4589.99 256.37        5.59   04/07/23 4528.96 4135.31 393.65         9.52   04/14/23 4639.27 4054.09 503.42         14.43   04/21/23 4365.46 4083.2 282.26          6.91   05/16/2023 4,585.59 4,280.87 304.72 7.12       Height:   6'1.5\"                            Weight:   272 lbs              BMI:   35      Pain Level at end of session (0-10 scale): 0/10      Assessment   Pt continues to respond well to CDT, stable volumes as he approaches transition to flat knit compression garments. Per  representative, patient's garment order was placed 5/22 and is expected to ship out later this week. Patient is receptive to bandaging 2x week until garment arrives, likely early next week.       Patient will continue to benefit from skilled OT services to address functional

## 2023-06-02 ENCOUNTER — APPOINTMENT (OUTPATIENT)
Dept: PHYSICAL THERAPY | Age: 78
End: 2023-06-02

## 2023-06-02 ENCOUNTER — HOSPITAL ENCOUNTER (OUTPATIENT)
Facility: HOSPITAL | Age: 78
Setting detail: RECURRING SERIES
Discharge: HOME | End: 2023-06-05
Payer: COMMERCIAL

## 2023-06-02 PROCEDURE — 97760 ORTHOTIC MGMT&TRAING 1ST ENC: CPT

## 2023-06-02 PROCEDURE — 97140 MANUAL THERAPY 1/> REGIONS: CPT

## 2023-06-07 ENCOUNTER — HOSPITAL ENCOUNTER (OUTPATIENT)
Facility: HOSPITAL | Age: 78
Setting detail: RECURRING SERIES
Discharge: HOME | End: 2023-06-10
Payer: COMMERCIAL

## 2023-06-07 PROCEDURE — 97763 ORTHC/PROSTC MGMT SBSQ ENC: CPT

## 2023-06-07 PROCEDURE — 97535 SELF CARE MNGMENT TRAINING: CPT

## 2023-06-07 NOTE — PROGRESS NOTES
powder or gold bond spray over B calf, heels to reduce friction                15 15 08485 Orthotic Management and Training LE (timed): improve positioning of lower extremity during weight bearing and gait, improve pressure distrubution of the plantar aspect of the foot to improve patient's ability to progress to PLOF and address remaining functional goals. Details if applicable:    Upper/Lower Extremity Compression: Fitted for the following compression products:    LE Bilateral, foot, ankle, and calf Knee high    Style: Flat knit    Brand: [email or web address]    Type: Custom: Class II Expert w/VY heel, ankle patch    Vendor: [employer]     Education: Educated patient in compression garment donning and doffing., Glove use with donning., Daily wear schedule. , and Daily laundering. Patient/family demonstrated donning and doffing with independence, additional instructions needed for safe product usage. , and using [employer]    Educated Pt on availability of [email or web address] [place] [employer] and ordered from [employer]        60 60    Total Total       Skin assessment post-treatment (if applicable):    [x]  intact    [x]  Redness-Pt had new tick bite on L knee. OT educated Pt in importance of avoiding bug bites to prevent infection, lymphedema flare up. Discussed wearing protective clothing and perforimng skin checks daily            []redness - adverse reaction:          Patient Education: [x] Review HEP    [x]  Patient Education billed concurrently with other procedures   [x] MLD Patient Education Reviewed with patient, as well as demonstration and instruction during MLD portion of session, Continued education in self MLD technique with bathing and skin care, and Provided patient with the written instructions to follow  [] Progressed/Changed HEP based on:   [] positioning   [] Kinesiotape   [] Skin care   [] wound care   [] other:   [] Patient/caregiver in multi-layer bandaging donning principles. and Precautions.   Patient /

## 2023-06-09 ENCOUNTER — APPOINTMENT (OUTPATIENT)
Facility: HOSPITAL | Age: 78
End: 2023-06-09
Payer: COMMERCIAL

## 2023-06-13 ENCOUNTER — APPOINTMENT (OUTPATIENT)
Facility: HOSPITAL | Age: 78
End: 2023-06-13
Payer: COMMERCIAL

## 2023-06-15 ENCOUNTER — HOSPITAL ENCOUNTER (OUTPATIENT)
Facility: HOSPITAL | Age: 78
Setting detail: RECURRING SERIES
Discharge: HOME | End: 2023-06-18
Payer: COMMERCIAL

## 2023-06-15 PROCEDURE — 97140 MANUAL THERAPY 1/> REGIONS: CPT

## 2023-06-15 PROCEDURE — 97760 ORTHOTIC MGMT&TRAING 1ST ENC: CPT

## 2023-06-23 ENCOUNTER — HOSPITAL ENCOUNTER (OUTPATIENT)
Facility: HOSPITAL | Age: 78
Setting detail: RECURRING SERIES
Discharge: HOME | End: 2023-06-26
Payer: COMMERCIAL

## 2023-06-23 PROCEDURE — 97140 MANUAL THERAPY 1/> REGIONS: CPT

## 2023-06-23 PROCEDURE — 97763 ORTHC/PROSTC MGMT SBSQ ENC: CPT

## 2023-08-04 ENCOUNTER — HOSPITAL ENCOUNTER (OUTPATIENT)
Facility: HOSPITAL | Age: 78
Setting detail: RECURRING SERIES
Discharge: HOME | End: 2023-08-07
Payer: COMMERCIAL

## 2023-08-04 PROCEDURE — 97140 MANUAL THERAPY 1/> REGIONS: CPT

## 2023-08-04 PROCEDURE — 97760 ORTHOTIC MGMT&TRAING 1ST ENC: CPT

## 2023-08-04 NOTE — PROGRESS NOTES
OCCUPATIONAL THERAPY - DAILY TREATMENT NOTE (updated 3/23)      Date: 2023          Patient Name:  Terese Hunt :  1945   Medical   Diagnosis:  Lymphedema, not elsewhere classified [I89.0] Treatment Diagnosis:  I89.0     Lymphedema, not elsewhere classified    Referral Source:  Santi Haynes MD Insurance:   [facility] / Plan: [employer]ERAL / Product Type: *No Product type* /                     Patient  verified yes     Visit #   Current  / Total 21 24   Time   In / Out 1215 1300   Total Treatment Time 45   Total Timed Codes 45         SUBJECTIVE    Pain Level (0-10 scale): 0    Any medication changes, allergies to medications, adverse drug reactions, diagnosis change, or new procedure performed?: [x] No    [] Yes (see summary sheet for update)  Medications: Verified on Patient Summary List    Subjective functional status/changes:     Patient arrived w/second pair of flat knit knee high garments in clinic, patient reports swelling has been well managed since his last visit. OBJECTIVE      Therapeutic Procedures: Tx Min Billable or 1:1 Min (if diff from Tx Min) Procedure, Rationale, Specifics   10 71 14921 Manual Therapy (timed):  decrease pain, increase ROM, and decrease edema to improve patient's ability to progress to PLOF and address remaining functional goals. The manual therapy interventions were performed at a separate and distinct time from the therapeutic activities interventions . (see flow sheet as applicable)    Details if applicable:    Re-assessed volumes, see chart below               35 67 51760 Orthotic Management and Training LE (timed): improve positioning of lower extremity during weight bearing and gait, improve pressure distrubution of the plantar aspect of the foot to improve patient's ability to progress to PLOF and address remaining functional goals.     Details if applicable:    Upper/Lower Extremity Compression: Fitted for the following compression

## 2023-08-04 NOTE — THERAPY DISCHARGE
[facility]   a part of [facility]  [address] [facility], [address] [place] [address]   Gabriel Rapp   Phone: [phone]  Fax: [phone]      OCCUPATIONAL THERAPY DISCHARGE SUMMARY  Patient Name: Norris Heath : 1945   Treatment/Medical Diagnosis: Lymphedema, not elsewhere classified [I89.0]   Referral Source: Zev Murphy MD     Date of Initial Visit: 3/15/2023 Attended Visits: 21 Missed Visits: 0     SUMMARY OF TREATMENT  Patient was seen for 21 visits, initially focused on phase I CDT including MLD, multi layered bandaging, skin hygiene, and educating patient in exercise routine and self MLD for home participation. Patient was then transitioned to custom flat knit garments and followed up with for a few visits to ensure he was successful in carrying over home program and limb volumes were stable. He is now appropriate for DC to home program.     CURRENT STATUS  Short term goals   Time frame: 12 visits    Patient will independently identify at least 5 practices of proper skin care/infection prevention over 3 consecutive sessions to reduce  risk of infections and wounds in 12 visits. Goal met 2023    Patient will demonstrate independence in lymphedema home program of therapeutic exercises and self MLD over 3 consecutive sessions to improve circulation and  decongest limb and for increased independence/tolerance for functional activities within 12 visits. Goal met 2023    Patient will demonstrate decreased volumetric measurements by 500 in each LE in order to reduce risk for infection, and increased independence/tolerance  for functional activity completion within 12 visits.  Goal met 2023   Long term goals   Time frame: 24 visits    Patient will demonstrate improved edema management evidenced by performing donning/doffing of garments from dependent to modified independent  3/3 times within session to aid in reducing risk for infection in 24 visits

## 2023-09-11 ENCOUNTER — HOSPITAL ENCOUNTER (EMERGENCY)
Facility: HOSPITAL | Age: 78
Discharge: HOME | End: 2023-09-11
Attending: EMERGENCY MEDICINE
Payer: COMMERCIAL

## 2023-09-11 ENCOUNTER — APPOINTMENT (OUTPATIENT)
Facility: HOSPITAL | Age: 78
End: 2023-09-11
Payer: COMMERCIAL

## 2023-09-11 VITALS
OXYGEN SATURATION: 94 % | BODY MASS INDEX: 36.05 KG/M2 | TEMPERATURE: 98.6 F | SYSTOLIC BLOOD PRESSURE: 149 MMHG | DIASTOLIC BLOOD PRESSURE: 61 MMHG | HEIGHT: 73 IN | WEIGHT: 272 LBS | RESPIRATION RATE: 18 BRPM | HEART RATE: 90 BPM
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
## 2023-09-11 DIAGNOSIS — D17.9 LIPOMA, UNSPECIFIED SITE: ICD-10-CM

## 2023-09-11 DIAGNOSIS — N28.1 RENAL CYST: ICD-10-CM

## 2023-09-11 DIAGNOSIS — S80.02XA HEMATOMA OF LEFT KNEE REGION: ICD-10-CM

## 2023-09-11 DIAGNOSIS — N28.9 ABNORMAL RENAL FUNCTION: ICD-10-CM

## 2023-09-11 DIAGNOSIS — N30.00 ACUTE CYSTITIS WITHOUT HEMATURIA: ICD-10-CM

## 2023-09-11 LAB
ALBUMIN SERPL-MCNC: 3.4 G/DL (ref 3.5–5)
ALBUMIN/GLOB SERPL: 1.1 (ref 1.1–2.2)
ALP SERPL-CCNC: 88 U/L (ref 45–117)
ALT SERPL-CCNC: 22 U/L (ref 12–78)
ANION GAP SERPL CALC-SCNC: 7 MMOL/L (ref 5–15)
APPEARANCE UR: CLEAR
AST SERPL-CCNC: 18 U/L (ref 15–37)
BACTERIA URNS QL MICRO: ABNORMAL /HPF
BASOPHILS # BLD: 0 K/UL (ref 0–0.1)
BASOPHILS NFR BLD: 0 % (ref 0–1)
BILIRUB SERPL-MCNC: 0.9 MG/DL (ref 0.2–1)
BILIRUB UR QL: NEGATIVE
BUN SERPL-MCNC: 23 MG/DL (ref 6–20)
BUN/CREAT SERPL: 16 (ref 12–20)
CALCIUM SERPL-MCNC: 8.4 MG/DL (ref 8.5–10.1)
CHLORIDE SERPL-SCNC: 100 MMOL/L (ref 97–108)
CO2 SERPL-SCNC: 31 MMOL/L (ref 21–32)
COLOR UR: ABNORMAL
COMMENT:: NORMAL
CREAT SERPL-MCNC: 1.42 MG/DL (ref 0.7–1.3)
DIFFERENTIAL METHOD BLD: ABNORMAL
EOSINOPHIL # BLD: 0 K/UL (ref 0–0.4)
EOSINOPHIL NFR BLD: 0 % (ref 0–7)
EPITH CASTS URNS QL MICRO: ABNORMAL /LPF
ERYTHROCYTE [DISTWIDTH] IN BLOOD BY AUTOMATED COUNT: 13.6 % (ref 11.5–14.5)
GLOBULIN SER CALC-MCNC: 3.2 G/DL (ref 2–4)
GLUCOSE SERPL-MCNC: 102 MG/DL (ref 65–100)
GLUCOSE UR STRIP.AUTO-MCNC: NEGATIVE MG/DL
HCT VFR BLD AUTO: 36.2 % (ref 36.6–50.3)
HGB BLD-MCNC: 12.2 G/DL (ref 12.1–17)
HGB UR QL STRIP: NEGATIVE
IMM GRANULOCYTES # BLD AUTO: 0.1 K/UL (ref 0–0.04)
IMM GRANULOCYTES NFR BLD AUTO: 0 % (ref 0–0.5)
KETONES UR QL STRIP.AUTO: ABNORMAL MG/DL
LEUKOCYTE ESTERASE UR QL STRIP.AUTO: ABNORMAL
LIPASE SERPL-CCNC: 39 U/L (ref 73–393)
LYMPHOCYTES # BLD: 0.8 K/UL (ref 0.8–3.5)
LYMPHOCYTES NFR BLD: 7 % (ref 12–49)
MCH RBC QN AUTO: 31.4 PG (ref 26–34)
MCHC RBC AUTO-ENTMCNC: 33.7 G/DL (ref 30–36.5)
MCV RBC AUTO: 93.3 FL (ref 80–99)
MONOCYTES # BLD: 1.1 K/UL (ref 0–1)
MONOCYTES NFR BLD: 9 % (ref 5–13)
NEUTS SEG # BLD: 9.8 K/UL (ref 1.8–8)
NEUTS SEG NFR BLD: 84 % (ref 32–75)
NITRITE UR QL STRIP.AUTO: NEGATIVE
NRBC # BLD: 0 K/UL (ref 0–0.01)
NRBC BLD-RTO: 0 PER 100 WBC
PH UR STRIP: 6.5 (ref 5–8)
PLATELET # BLD AUTO: 233 K/UL (ref 150–400)
PMV BLD AUTO: 9 FL (ref 8.9–12.9)
POTASSIUM SERPL-SCNC: 3.5 MMOL/L (ref 3.5–5.1)
PROT SERPL-MCNC: 6.6 G/DL (ref 6.4–8.2)
PROT UR STRIP-MCNC: ABNORMAL MG/DL
RBC # BLD AUTO: 3.88 M/UL (ref 4.1–5.7)
RBC #/AREA URNS HPF: ABNORMAL /HPF (ref 0–5)
SODIUM SERPL-SCNC: 138 MMOL/L (ref 136–145)
SP GR UR REFRACTOMETRY: 1.01 (ref 1–1.03)
SPECIMEN HOLD: NORMAL
UROBILINOGEN UR QL STRIP.AUTO: 0.2 EU/DL (ref 0.2–1)
WBC # BLD AUTO: 11.8 K/UL (ref 4.1–11.1)
WBC URNS QL MICRO: ABNORMAL /HPF (ref 0–4)

## 2023-09-11 PROCEDURE — 6360000004 HC RX CONTRAST MEDICATION: Performed by: PHYSICIAN ASSISTANT

## 2023-09-11 PROCEDURE — 73562 X-RAY EXAM OF KNEE 3: CPT

## 2023-09-11 PROCEDURE — 85025 COMPLETE CBC W/AUTO DIFF WBC: CPT

## 2023-09-11 PROCEDURE — 72125 CT NECK SPINE W/O DYE: CPT

## 2023-09-11 PROCEDURE — 87077 CULTURE AEROBIC IDENTIFY: CPT

## 2023-09-11 PROCEDURE — 87186 SC STD MICRODIL/AGAR DIL: CPT

## 2023-09-11 PROCEDURE — 87086 URINE CULTURE/COLONY COUNT: CPT

## 2023-09-11 PROCEDURE — 83690 ASSAY OF LIPASE: CPT

## 2023-09-11 PROCEDURE — 90471 IMMUNIZATION ADMIN: CPT | Performed by: PHYSICIAN ASSISTANT

## 2023-09-11 PROCEDURE — 90714 TD VACC NO PRESV 7 YRS+ IM: CPT | Performed by: PHYSICIAN ASSISTANT

## 2023-09-11 PROCEDURE — 71046 X-RAY EXAM CHEST 2 VIEWS: CPT

## 2023-09-11 PROCEDURE — 80053 COMPREHEN METABOLIC PANEL: CPT

## 2023-09-11 PROCEDURE — 36415 COLL VENOUS BLD VENIPUNCTURE: CPT

## 2023-09-11 PROCEDURE — 70450 CT HEAD/BRAIN W/O DYE: CPT

## 2023-09-11 PROCEDURE — 6360000002 HC RX W HCPCS: Performed by: PHYSICIAN ASSISTANT

## 2023-09-11 PROCEDURE — 81001 URINALYSIS AUTO W/SCOPE: CPT

## 2023-09-11 PROCEDURE — 71270 CT THORAX DX C-/C+: CPT

## 2023-09-11 PROCEDURE — 99285 EMERGENCY DEPT VISIT HI MDM: CPT

## 2023-09-11 RX ORDER — AMIODARONE HYDROCHLORIDE 200 MG/1
200 TABLET ORAL DAILY
COMMUNITY

## 2023-09-11 RX ORDER — CEFDINIR 300 MG/1
300 CAPSULE ORAL 2 TIMES DAILY
Qty: 14 CAPSULE | Refills: 0 | Status: SHIPPED | OUTPATIENT
Start: 2023-09-11 | End: 2023-09-21

## 2023-09-11 RX ADMIN — CLOSTRIDIUM TETANI TOXOID ANTIGEN (FORMALDEHYDE INACTIVATED) AND CORYNEBACTERIUM DIPHTHERIAE TOXOID ANTIGEN (FORMALDEHYDE INACTIVATED) 0.5 ML: 5; 2 INJECTION, SUSPENSION INTRAMUSCULAR at 13:11

## 2023-09-11 RX ADMIN — IOPAMIDOL 100 ML: 755 INJECTION, SOLUTION INTRAVENOUS at 14:46

## 2023-09-11 ASSESSMENT — PAIN SCALES - GENERAL: PAINLEVEL_OUTOF10: 5

## 2023-09-11 NOTE — ED NOTES
Patient discharged. Awaiting radiology disc for departure.      Reagan Leventhal, RN  09/11/23 4740
The patient was discharged home by provider in stable condition. The patient is alert and oriented, in no respiratory distress. The patient's diagnosis, condition and treatment were explained. The patient expressed understanding. One prescription given. No work/school note given. A discharge plan has been developed. A  was not involved in the process. Aftercare instructions were given. Pt ambulatory out of the ED.        Reagan Leventhal, RN  09/11/23 1519
NC/AT

## 2023-09-11 NOTE — DISCHARGE INSTRUCTIONS
Please follow-up closely with your primary care for the multiple issues that were discussed in the emergency room. Return to the ER if there is any change or worsening of symptoms.

## 2023-09-11 NOTE — ED PROVIDER NOTES
to the Emergency Department should their condition change or worsen prior to their follow-up appointment. All questions have been answered and patient and/or available family express understanding.       Prescriptions provided to the patient:     New Prescriptions    CEFDINIR (OMNICEF) 300 MG CAPSULE    Take 1 capsule by mouth 2 times daily for 10 days        CARROLL Garg   Emergency Medicine      [address]  09/11/23 1412

## 2023-09-14 LAB
BACTERIA SPEC CULT: ABNORMAL
CC UR VC: ABNORMAL
SERVICE CMNT-IMP: ABNORMAL

## 2023-09-15 RX ORDER — AMOXICILLIN 500 MG/1
500 TABLET, FILM COATED ORAL 2 TIMES DAILY
Qty: 20 TABLET | Refills: 0 | Status: SHIPPED | OUTPATIENT
Start: 2023-09-15

## 2023-11-21 ENCOUNTER — HOSPITAL ENCOUNTER (OUTPATIENT)
Facility: HOSPITAL | Age: 78
Setting detail: RECURRING SERIES
Discharge: HOME | End: 2023-11-24
Payer: COMMERCIAL

## 2023-11-21 PROCEDURE — 97165 OT EVAL LOW COMPLEX 30 MIN: CPT

## 2023-11-21 PROCEDURE — 97140 MANUAL THERAPY 1/> REGIONS: CPT

## 2023-11-21 PROCEDURE — 97760 ORTHOTIC MGMT&TRAING 1ST ENC: CPT

## 2023-11-21 NOTE — THERAPY EVALUATION
[facility]   a part of [address] [place] [address]  [address] [facility], [address] [place] [address]   [facility][phone]   [address] [place] [address] RE-EVALUATION/PLAN OF CARE NOTE (updated 3/23)      Date: 2023          Patient Name:  Katlyn Baires :  1945   Medical   Diagnosis:  Lymphedema, not elsewhere classified [I89.0] Treatment Diagnosis:  I89.0     Lymphedema, not elsewhere classified and R60.9     Edema, unspecified    Referral Source:  Amanda Mcmillan MD Provider #:  [id number]                Insurance: Payor: Adele Stevenson / Plan: Eddie Almendarez / Product Type: *No Product type* /        Patient  verified yes     Visit #   Current  / Total 1 4   Time   In / Out 1200 1300   Total Treatment Time 60   Total Timed Codes 45   1:1 Treatment Time 60      [facility] Totals Reminder:  bill using total billable   min of TIMED therapeutic procedures and modalities.    8-22 min = 1 unit; 23-37 min = 2 units; 38-52 min = 3 units;  53-67 min = 4 units; 68-82 min = 5 units         SUBJECTIVE  Pain Level (0-10 scale): 0/10  []constant []intermittent []improving []worsening []no change since onset    Any medication changes, allergies to medications, adverse drug reactions, diagnosis change, or new procedure performed?: [x] No    [] Yes (see summary sheet for update)  Medications: Verified on Patient Summary List    Subjective functional status/changes:     Patient is here today for re-eval of BLE lymphedema and to be measured for new garments     Start of Care: 2023  Onset Date: 1 year ago   Current symptoms/Complaints: Current pair of compression stockings are showing signs of wear, Pt is ready to order new pair   Mechanism of Injury: Swelling initially began after L total knee replacement in   PLOF: Mod I ADLs,

## 2023-11-21 NOTE — THERAPY EVALUATION
Statement of Medical Necessity    Page 1 of 2      Torres Luo 1945 Today's Date: 11/21/2023 LEONEL: [employer]   Payor: Yordy Regalado / Plan: Marcella Prasad / Product Type: *No Product type* /  ME: TBD  Refills: 2               Diagnosis  []   I97.2 Post-Mastectomy Lymphedema []   I87.2 Venous Insufficiency   [x]   I89.0 Lymphedema, other secondary  []   I83.019 Venous Stasis Ulcer LE, Right   []   I89.9 Unspecified Lymphatic Disorder []   I83.029 Venous Stasis Ulcer LE, Left   [x]   R60.9 Swelling not relieved by elevation []   Q82.0 Hereditary/ Congenital Lymphedema   []   C50.211 Malignant neoplasm of breast, Right []   G89.3  Cancer associated pain   []   C50.212 Malignant neoplasm of breast, Left []   L03.115 LE Cellulitis, Right   []    []   L03.116 LE Cellulitis, Left                                                           Torres Luo    1945  Page 2 of 2    Physician Order for DME for Diagnosis of BLE Lymphedema as Listed on Statement of Medical Necessity, Page 1        Recommended Product:  Units Upper Extremity Rt Lt Units Lower Extremity Rt Lt    Circ-Aid, Ready Wrap, Sigvaris Arm   2 Inelastic binders (Circ-Aid, Farrow)  []Foot   [x]Below Knee   []Knee   []Thigh x x    Circ-Aid Ready Wrap, Sigvaris hand    Tucker Emmanuel, night use []Full Leg  []Lower Leg      Tribute Arm, night use   2 Tribute, night use  []Full Leg  [x]Lower Leg x x    Tucker Emmanuel Arm, night use    Jj Sleeve Leg/ Foot, night use      Gradiant Compression Sleeves & Gloves  []Custom [] RM Arm:  []CCL1 []CCL2 []CCL3  []Custom [] RM Glove: []CCL1 []CCL2 []CCL3     6 [employer]ient Compression Stockings   [x]Custom  [x]RM Lower Extremity:   [x]CCL1       [x]CCL2         [x]CCL3   [x]Knee       []Thigh        []Waist Length x x    Jj sleeve arm w/ hand, night use    Tribute Wrap, night use      Compression Bra          Compression Vest         The above patient was referred for treatment of Lymphedema due to the diagnosis indicated above.

## 2023-12-28 ENCOUNTER — HOSPITAL ENCOUNTER (OUTPATIENT)
Facility: HOSPITAL | Age: 78
Setting detail: RECURRING SERIES
Discharge: HOME | End: 2023-12-31
Payer: COMMERCIAL

## 2023-12-28 PROCEDURE — 97161 PT EVAL LOW COMPLEX 20 MIN: CPT

## 2023-12-28 PROCEDURE — 97760 ORTHOTIC MGMT&TRAING 1ST ENC: CPT

## 2023-12-28 NOTE — THERAPY EVALUATION
[facility]   a part of [facility]  [address], [address]   [place] [address]                                                    Phone:[phone]   Fax:[phone]                                                                                 PHYSICAL THERAPY LYMPHEDEMA - MEDICARE RE-EVALUATION/PLAN OF CARE NOTE (updated 3/23)      Date: 2023          Patient Name:  Gavin Garcias :  1945   Medical   Diagnosis:  Lymphedema, not elsewhere classified [I89.0] Treatment Diagnosis:  I89.0     Lymphedema, not elsewhere classified and R60.9     Edema, unspecified    Referral Source:  Erik Briggs MD Provider #:  [id number]                Insurance: Payor: [facility] / Plan: [facility] FEDERAL / Product Type: *No Product type* /        Patient  verified yes     Visit #   Current  / Total 1 1   Time   In / Out 1245 1315   Total Treatment Time 30   Total Timed Codes 23   1:1 Treatment Time 30       BC Totals Reminder:  bill using total billable   min of TIMED therapeutic procedures and modalities.   8-22 min = 1 unit; 23-37 min = 2 units; 38-52 min = 3 units;  53-67 min = 4 units; 68-82 min = 5 units         SUBJECTIVE  Pain Level (0-10 scale): 0/10  []constant []intermittent []improving []worsening [x]no change since onset    Any medication changes, allergies to medications, adverse drug reactions, diagnosis change, or new procedure performed?: [x] No    [] Yes (see summary sheet for update)  Medications: Verified on Patient Summary List    Subjective functional status/changes:     Patient presents as re-evaluation for garment fit, and continuation of care previously established with OT. Patient presents with compression garments previously measured by OT for fit testing. Patient has completed intensive phase of CDT in the past with successful discharge to maintenance with use of custom compression stockings for long term management.    Start of Care:

## 2024-08-06 ENCOUNTER — HOSPITAL ENCOUNTER (OUTPATIENT)
Facility: HOSPITAL | Age: 79
Discharge: HOME | End: 2024-08-09
Payer: MEDICARE

## 2024-08-06 VITALS
SYSTOLIC BLOOD PRESSURE: 161 MMHG | RESPIRATION RATE: 20 BRPM | BODY MASS INDEX: 37.68 KG/M2 | WEIGHT: 284.3 LBS | OXYGEN SATURATION: 95 % | HEIGHT: 73 IN | DIASTOLIC BLOOD PRESSURE: 74 MMHG | HEART RATE: 95 BPM | TEMPERATURE: 97.5 F

## 2024-08-06 LAB
EST. AVERAGE GLUCOSE BLD GHB EST-MCNC: 114 MG/DL
HBA1C MFR BLD: 5.6 % (ref 4–5.6)

## 2024-08-06 PROCEDURE — 36415 COLL VENOUS BLD VENIPUNCTURE: CPT

## 2024-08-06 PROCEDURE — 83036 HEMOGLOBIN GLYCOSYLATED A1C: CPT

## 2024-08-06 RX ORDER — OLMESARTAN MEDOXOMIL AND HYDROCHLOROTHIAZIDE 40/25 40; 25 MG/1; MG/1
1 TABLET ORAL DAILY
COMMUNITY

## 2024-08-06 RX ORDER — HYDRALAZINE HYDROCHLORIDE 50 MG/1
100 TABLET, FILM COATED ORAL
COMMUNITY

## 2024-08-06 RX ORDER — CETIRIZINE HYDROCHLORIDE 10 MG/1
10 TABLET ORAL DAILY
COMMUNITY

## 2024-08-06 RX ORDER — LEVOTHYROXINE SODIUM 0.15 MG/1
150 TABLET ORAL EVERY OTHER DAY
COMMUNITY

## 2024-08-06 RX ORDER — MULTIVITAMIN WITH IRON
250 TABLET ORAL DAILY
COMMUNITY

## 2024-08-06 RX ORDER — LANOLIN ALCOHOL/MO/W.PET/CERES
1000 CREAM (GRAM) TOPICAL DAILY
COMMUNITY

## 2024-08-06 RX ORDER — ACETAMINOPHEN 160 MG
1 TABLET,DISINTEGRATING ORAL DAILY
COMMUNITY

## 2024-08-06 RX ORDER — HYDRALAZINE HYDROCHLORIDE 50 MG/1
50 TABLET, FILM COATED ORAL
COMMUNITY

## 2024-08-06 ASSESSMENT — PAIN DESCRIPTION - DESCRIPTORS: DESCRIPTORS: ACHING

## 2024-08-06 ASSESSMENT — PAIN DESCRIPTION - ORIENTATION: ORIENTATION: LEFT

## 2024-08-06 ASSESSMENT — PAIN DESCRIPTION - LOCATION: LOCATION: KNEE

## 2024-08-06 ASSESSMENT — PAIN SCALES - GENERAL: PAINLEVEL_OUTOF10: 7

## 2024-08-06 NOTE — PERIOP NOTE
Spoke with Sully from Dr. Rose's office who confirmed that Dr. Rose's office did not instruct the patient to hold his Xarelto.  Faxed a Xarelto plan to ALEXANDER Moses's office for her completion and return.  Noted on the fax cover sheet that the patient has already stopping this medication because he was told to \"by a nurse.\"  Also informed the office that the patient is a very poor historian.  DOS: 8/15/2024

## 2024-08-06 NOTE — DISCHARGE INSTRUCTIONS
[facility]                   [address] [place] [address]   MAIN OR                                  [phone]   MAIN PRE OP                          [phone]                                                                                AMBULATORY PRE OP          [phone]  PRE-ADMISSION TESTING    [phone]   Surgery Date:  Thursday, 8/15/2024       Is surgery arrival time given by surgeon?  YES  If “NO”, [facility] staff will call you between 3 and 7pm the day before your surgery with your arrival time. (If your surgery is on a Monday, we will call you the Friday before.)    Call [phone] after 7pm Monday-Friday if you did not receive this call.    INSTRUCTIONS BEFORE YOUR SURGERY   When You  Arrive Arrive at the 2nd Floor Admitting Desk on the day of your surgery  Have your insurance card, photo ID, and any copayment (if needed)   Food   and   Drink NO food or drink after midnight the night before surgery    This means NO water, gum, mints, coffee, juice, etc.  No alcohol (beer, wine, liquor) 24 hours before and after surgery   Medications to   TAKE   Morning of Surgery MEDICATIONS TO TAKE THE MORNING OF SURGERY WITH A SIP OF WATER:   Amlodipine, Hydralazine, Synthroid, and Zyrtec  Do NOT take your Benicar/HCTZ the morning of your surgery   Medications  To  STOP      7 days before surgery Non-Steroidal anti-inflammatory Drugs (NSAID's): for example, Ibuprofen (Advil, Motrin), Naproxen (Aleve)  Aspirin, if taking for pain   Herbal supplements, vitamins, and fish (including your Vitamin B, Zinc, and Magnesium)  OK to continue your vitamin D.  Other:  (Pain medications not listed above, including Tylenol may be taken)   Blood  Thinners Ask your cardiologist when to hold/ resume your Xarelto in relation to your surgery.   Bathing Clothing  Jewelry  Valuables     If you shower the morning of surgery, please do not apply anything to your skin (lotions,

## 2024-08-09 NOTE — PERIOP NOTE
Per Dr. Rose note, patient is to have cardiac clearance as well as instructions for Xarelto. Attempted to call patient x2 today to inquire on status on cardiac clearance appt. Mobile mailbox full. LM on home phone to call PAT.

## 2024-10-08 NOTE — PERIOP NOTE
The patient was cancelled previously for this surgery in August of this year due to illness.  He was to get a cardiac clearance then for this surgery, at the request of Dr. Rose's office.  Called the office of ALEXANDER Moses inquiring if the patient was seen recently for cardiac clearance.  Per the office, he is being seen tomorrow morning at 0730 for a follow up appt but there is no record of a cardiac clearance appt.  Xarelto plan is on the paper chart from the previous surgery.  DOS: 10/10/2024

## 2024-10-10 ENCOUNTER — HOSPITAL ENCOUNTER (OUTPATIENT)
Facility: HOSPITAL | Age: 79
Setting detail: OUTPATIENT SURGERY
Discharge: HOME | End: 2024-10-10
Attending: ORTHOPAEDIC SURGERY | Admitting: ORTHOPAEDIC SURGERY
Payer: COMMERCIAL

## 2024-10-10 ENCOUNTER — ANESTHESIA (OUTPATIENT)
Facility: HOSPITAL | Age: 79
End: 2024-10-10
Payer: COMMERCIAL

## 2024-10-10 ENCOUNTER — ANESTHESIA EVENT (OUTPATIENT)
Facility: HOSPITAL | Age: 79
End: 2024-10-10
Payer: COMMERCIAL

## 2024-10-10 VITALS
OXYGEN SATURATION: 94 % | SYSTOLIC BLOOD PRESSURE: 134 MMHG | HEIGHT: 73 IN | RESPIRATION RATE: 19 BRPM | WEIGHT: 280 LBS | DIASTOLIC BLOOD PRESSURE: 68 MMHG | HEART RATE: 87 BPM | BODY MASS INDEX: 37.11 KG/M2 | TEMPERATURE: 97.8 F

## 2024-10-10 PROCEDURE — 3700000001 HC ADD 15 MINUTES (ANESTHESIA): Performed by: ORTHOPAEDIC SURGERY

## 2024-10-10 PROCEDURE — 6360000002 HC RX W HCPCS: Performed by: ORTHOPAEDIC SURGERY

## 2024-10-10 PROCEDURE — 3600000002 HC SURGERY LEVEL 2 BASE: Performed by: ORTHOPAEDIC SURGERY

## 2024-10-10 PROCEDURE — 2500000003 HC RX 250 WO HCPCS: Performed by: ORTHOPAEDIC SURGERY

## 2024-10-10 PROCEDURE — 2709999900 HC NON-CHARGEABLE SUPPLY: Performed by: ORTHOPAEDIC SURGERY

## 2024-10-10 PROCEDURE — 2580000003 HC RX 258: Performed by: ORTHOPAEDIC SURGERY

## 2024-10-10 PROCEDURE — 2580000003 HC RX 258: Performed by: STUDENT IN AN ORGANIZED HEALTH CARE EDUCATION/TRAINING PROGRAM

## 2024-10-10 PROCEDURE — 3600000012 HC SURGERY LEVEL 2 ADDTL 15MIN: Performed by: ORTHOPAEDIC SURGERY

## 2024-10-10 PROCEDURE — 7100000010 HC PHASE II RECOVERY - FIRST 15 MIN: Performed by: ORTHOPAEDIC SURGERY

## 2024-10-10 PROCEDURE — 2500000003 HC RX 250 WO HCPCS: Performed by: NURSE ANESTHETIST, CERTIFIED REGISTERED

## 2024-10-10 PROCEDURE — 7100000001 HC PACU RECOVERY - ADDTL 15 MIN: Performed by: ORTHOPAEDIC SURGERY

## 2024-10-10 PROCEDURE — 3700000000 HC ANESTHESIA ATTENDED CARE: Performed by: ORTHOPAEDIC SURGERY

## 2024-10-10 PROCEDURE — 6360000002 HC RX W HCPCS: Performed by: NURSE ANESTHETIST, CERTIFIED REGISTERED

## 2024-10-10 PROCEDURE — 7100000011 HC PHASE II RECOVERY - ADDTL 15 MIN: Performed by: ORTHOPAEDIC SURGERY

## 2024-10-10 PROCEDURE — 7100000000 HC PACU RECOVERY - FIRST 15 MIN: Performed by: ORTHOPAEDIC SURGERY

## 2024-10-10 PROCEDURE — 88304 TISSUE EXAM BY PATHOLOGIST: CPT

## 2024-10-10 RX ORDER — DEXMEDETOMIDINE HYDROCHLORIDE 100 UG/ML
INJECTION, SOLUTION INTRAVENOUS
Status: DISCONTINUED | OUTPATIENT
Start: 2024-10-10 | End: 2024-10-10 | Stop reason: SDUPTHER

## 2024-10-10 RX ORDER — ONDANSETRON 2 MG/ML
4 INJECTION INTRAMUSCULAR; INTRAVENOUS
Status: DISCONTINUED | OUTPATIENT
Start: 2024-10-10 | End: 2024-10-10 | Stop reason: HOSPADM

## 2024-10-10 RX ORDER — LIDOCAINE HYDROCHLORIDE 10 MG/ML
1 INJECTION, SOLUTION EPIDURAL; INFILTRATION; INTRACAUDAL; PERINEURAL
Status: DISCONTINUED | OUTPATIENT
Start: 2024-10-10 | End: 2024-10-10 | Stop reason: HOSPADM

## 2024-10-10 RX ORDER — FENTANYL CITRATE 50 UG/ML
INJECTION, SOLUTION INTRAMUSCULAR; INTRAVENOUS
Status: DISCONTINUED | OUTPATIENT
Start: 2024-10-10 | End: 2024-10-10 | Stop reason: SDUPTHER

## 2024-10-10 RX ORDER — PROPOFOL 10 MG/ML
INJECTION, EMULSION INTRAVENOUS
Status: DISCONTINUED | OUTPATIENT
Start: 2024-10-10 | End: 2024-10-10 | Stop reason: SDUPTHER

## 2024-10-10 RX ORDER — NALOXONE HYDROCHLORIDE 0.4 MG/ML
INJECTION, SOLUTION INTRAMUSCULAR; INTRAVENOUS; SUBCUTANEOUS PRN
Status: DISCONTINUED | OUTPATIENT
Start: 2024-10-10 | End: 2024-10-10 | Stop reason: HOSPADM

## 2024-10-10 RX ORDER — DIPHENHYDRAMINE HYDROCHLORIDE 50 MG/ML
12.5 INJECTION INTRAMUSCULAR; INTRAVENOUS
Status: DISCONTINUED | OUTPATIENT
Start: 2024-10-10 | End: 2024-10-10 | Stop reason: HOSPADM

## 2024-10-10 RX ORDER — SODIUM CHLORIDE, SODIUM LACTATE, POTASSIUM CHLORIDE, CALCIUM CHLORIDE 600; 310; 30; 20 MG/100ML; MG/100ML; MG/100ML; MG/100ML
INJECTION, SOLUTION INTRAVENOUS CONTINUOUS
Status: DISCONTINUED | OUTPATIENT
Start: 2024-10-10 | End: 2024-10-10 | Stop reason: HOSPADM

## 2024-10-10 RX ORDER — FENTANYL CITRATE 50 UG/ML
100 INJECTION, SOLUTION INTRAMUSCULAR; INTRAVENOUS
Status: DISCONTINUED | OUTPATIENT
Start: 2024-10-10 | End: 2024-10-10 | Stop reason: HOSPADM

## 2024-10-10 RX ORDER — ONDANSETRON 2 MG/ML
INJECTION INTRAMUSCULAR; INTRAVENOUS
Status: DISCONTINUED | OUTPATIENT
Start: 2024-10-10 | End: 2024-10-10 | Stop reason: SDUPTHER

## 2024-10-10 RX ORDER — DEXAMETHASONE SODIUM PHOSPHATE 4 MG/ML
INJECTION, SOLUTION INTRA-ARTICULAR; INTRALESIONAL; INTRAMUSCULAR; INTRAVENOUS; SOFT TISSUE
Status: DISCONTINUED | OUTPATIENT
Start: 2024-10-10 | End: 2024-10-10 | Stop reason: SDUPTHER

## 2024-10-10 RX ADMIN — DEXMEDETOMIDINE 8 MCG: 100 INJECTION, SOLUTION INTRAVENOUS at 13:00

## 2024-10-10 RX ADMIN — SODIUM CHLORIDE, POTASSIUM CHLORIDE, SODIUM LACTATE AND CALCIUM CHLORIDE: 600; 310; 30; 20 INJECTION, SOLUTION INTRAVENOUS at 12:16

## 2024-10-10 RX ADMIN — FENTANYL CITRATE 50 MCG: 50 INJECTION, SOLUTION INTRAMUSCULAR; INTRAVENOUS at 12:58

## 2024-10-10 RX ADMIN — DEXAMETHASONE SODIUM PHOSPHATE 4 MG: 4 INJECTION INTRA-ARTICULAR; INTRALESIONAL; INTRAMUSCULAR; INTRAVENOUS; SOFT TISSUE at 13:00

## 2024-10-10 RX ADMIN — DEXMEDETOMIDINE 4 MCG: 100 INJECTION, SOLUTION INTRAVENOUS at 12:54

## 2024-10-10 RX ADMIN — DEXMEDETOMIDINE 4 MCG: 100 INJECTION, SOLUTION INTRAVENOUS at 13:11

## 2024-10-10 RX ADMIN — ONDANSETRON 4 MG: 2 INJECTION INTRAMUSCULAR; INTRAVENOUS at 13:00

## 2024-10-10 RX ADMIN — FENTANYL CITRATE 50 MCG: 50 INJECTION, SOLUTION INTRAMUSCULAR; INTRAVENOUS at 12:53

## 2024-10-10 RX ADMIN — SODIUM CHLORIDE 2000 MG: 900 INJECTION INTRAVENOUS at 12:54

## 2024-10-10 RX ADMIN — PROPOFOL 100 MCG/KG/MIN: 10 INJECTION, EMULSION INTRAVENOUS at 12:58

## 2024-10-10 RX ADMIN — DEXMEDETOMIDINE 4 MCG: 100 INJECTION, SOLUTION INTRAVENOUS at 13:04

## 2024-10-10 RX ADMIN — LIDOCAINE HYDROCHLORIDE 40 MG: 10 INJECTION, SOLUTION EPIDURAL; INFILTRATION; INTRACAUDAL; PERINEURAL at 12:58

## 2024-10-10 ASSESSMENT — PAIN - FUNCTIONAL ASSESSMENT: PAIN_FUNCTIONAL_ASSESSMENT: NONE - DENIES PAIN

## 2024-10-10 ASSESSMENT — PAIN SCALES - GENERAL: PAINLEVEL_OUTOF10: 0

## 2024-10-10 NOTE — ANESTHESIA POSTPROCEDURE EVALUATION
Department of Anesthesiology  Postprocedure Note    Patient: Gavin Garcias  MRN: [id number]  YOB: 1945  Date of evaluation: 10/10/2024    Procedure Summary       Date: 10/10/24 Room / Location: [facility] MAIN OR  / [facility] MAIN OR    Anesthesia Start: 1254 Anesthesia Stop: 1356    Procedure: LEFT ANKLE CYST EXCISION (SEDATION WITH LOCAL) (Left: Ankle) Diagnosis:       Ganglion of left ankle      (Ganglion of left ankle [M67.472])    Surgeons: Jack Rose MD Responsible Provider: Jem Goodson MD    Anesthesia Type: MAC ASA Status: 3            Anesthesia Type: No value filed.    Dayday Phase I: Dayday Score: 10    Dayday Phase II:      Anesthesia Post Evaluation    Patient location during evaluation: PACU  Patient participation: complete - patient participated  Level of consciousness: awake and alert  Pain score: 0  Airway patency: patent  Nausea & Vomiting: no vomiting and no nausea  Cardiovascular status: hemodynamically stable  Respiratory status: room air  Hydration status: stable  Multimodal analgesia pain management approach  Pain management: adequate    No notable events documented.

## 2024-10-10 NOTE — H&P
The patient's History and Physical within 30 days of today's date  was reviewed with the patient and I examined the patient. There was no change. The surgical site was confirmed by the patient and me.       Plan: The risks, benefits, expected outcome, and alternative to the recommended procedure have been discussed with the patient. Patient understands and wants to proceed with the procedure. Full paper H&P on the chart    Electronically signed by Jack Rose MD on 10/10/2024 at 12:04 PM

## 2024-10-10 NOTE — ANESTHESIA PRE PROCEDURE
Yes Automatic Reconciliation, Ar       Current medications:    Current Facility-Administered Medications   Medication Dose Route Frequency Provider Last Rate Last Admin   • lidocaine PF 1 % injection 1 mL  1 mL IntraDERmal Once PRN Juanito Anthony MD       • fentaNYL (SUBLIMAZE) injection 100 mcg  100 mcg IntraVENous Once PRN Juanito Anthony MD       • lactated ringers IV soln infusion   IntraVENous Continuous Juanito Anthony MD       • ceFAZolin Sodium (ANCEF) 3,000 mg in sodium chloride 0.9 % 100 mL (mini-bag)  3,000 mg IntraVENous Once Jack Rose MD           Allergies:    Allergies   Allergen Reactions   • Diazepam Other (See Comments)     Made him overstimulated- hit the doctor       Problem List:    Patient Active Problem List   Diagnosis Code   • Primary osteoarthritis of left knee M17.12       Past Medical History:        Diagnosis Date   • Anticoagulated     Xarelto   • COVID-19 vaccine series completed 04/15/2021    Moderna   • Diverticulosis    • HTN (hypertension)    • Hyperlipemia    • Hypothyroidism    • Osteoarthritis    • Paroxysmal A-fib (HCC)    • Poor historian    • Thyroid cancer (HCC)     Partial thyroidectomy       Past Surgical History:        Procedure Laterality Date   • ANKLE FRACTURE SURGERY Left 2000   • KNEE SURGERY Left    • LUMBAR FUSION     • ORTHOPEDIC SURGERY Left 06/30/2021    Removal of hardware of ankle   • THYROIDECTOMY, PARTIAL         Social History:    Social History     Tobacco Use   • Smoking status: Never   • Smokeless tobacco: Never   Substance Use Topics   • Alcohol use: Never                                Counseling given: Not Answered      Vital Signs (Current):   Vitals:    10/10/24 1155 10/10/24 1207   BP: 121/67    Pulse: 99 99   Resp: 16    Temp: 98.7 °F (37.1 °C)    TempSrc: Oral    SpO2: 100%    Weight: 127 kg (280 lb)    Height: 1.854 m (6' 1\")                                               BP Readings from Last 3 Encounters:   10/10/24 121/67

## 2024-10-10 NOTE — OP NOTE
marked his left ankle.  He was taken back to the operating room, placed on the operating room table and secured to the table with safety strap and he was sedated by the Anesthesia team.  The left lower extremity was prepped and draped in usual sterile fashion.  A preop time-out was called.  Again, the correct patient, extremity, and operation were identified, all parties were in agreement, we proceeded with the operation.  The patient received IV antibiotics prior to incision.    The left lower extremity was then exsanguinated with an Esmarch bandage and this was used on the ankle as a tourniquet.    A longitudinal incision was made on the medial aspect of the palpable and visible cyst at the anteromedial ankle joint line.  Sharp dissection was carried down to level of skin and blunt dissection was carried down to the level of the cyst, which was ruptured with a pair of scissors and there was a return of yellow tinged clear gelatinous fluid.  The cyst communicated with the ankle joint proper.    The cyst was then meticulously dissected from its soft tissue attachments, taking care to protect the branches of the saphenous vein and medial branches of the superficial peroneal nerve.    Once the cyst was completely removed from its attachments, it was sent to the lab and further inspection of the incision showed that the cyst communicated directly with the ankle joint.  The joint and the wound were then copiously irrigated with normal saline.  The defect in the joint from the cyst was then repaired with a 0 Vicryl suture.  The wound was then closed in layers and a sterile dressing was applied.      During the procedure, Benjamin Diaz PA-C performed the duties of positioning, retraction, assistance with limb and implant management, closure, and dressing application.  After the wound was closed, a sterile dressing was applied.      The tourniquet was dropped and a soft wrap applied to the left ankle.  The patient was then  awakened from sedation and taken to the recovery room in hemodynamically stable condition.  All lap and sharp counts were correct at the end of the case.    DRAINS:  None.    POSTOPERATIVE CARE:  The patient will be discharged home when he meets PACU criteria.  He will be weightbearing as tolerated to the left lower extremity in a boot.  He will start aspirin 81 mg p.o. b.i.d. for DVT prophylaxis on the morning of postop day #1 and he will follow up in the [facility] office as scheduled for postoperative followup.        CHIP MOORE MD      PHW/AQS  D:  10/10/2024 13:48:34  T:  10/10/2024 14:27:13  JOB #:  [id number]

## 2025-04-16 ENCOUNTER — TRANSCRIBE ORDERS (OUTPATIENT)
Facility: HOSPITAL | Age: 80
End: 2025-04-16

## 2025-04-16 DIAGNOSIS — M17.11 PRIMARY OSTEOARTHRITIS OF RIGHT KNEE: Primary | ICD-10-CM

## 2025-04-26 ENCOUNTER — HOSPITAL ENCOUNTER (OUTPATIENT)
Facility: HOSPITAL | Age: 80
Discharge: HOME | End: 2025-04-29
Attending: ORTHOPAEDIC SURGERY
Payer: COMMERCIAL

## 2025-04-26 DIAGNOSIS — M17.11 PRIMARY OSTEOARTHRITIS OF RIGHT KNEE: ICD-10-CM

## 2025-04-26 PROCEDURE — 73700 CT LOWER EXTREMITY W/O DYE: CPT

## 2025-05-14 NOTE — H&P
preoperative history and physical and does NOT imply medical clearance for their surgical procedure.  Additional clearance from their PCP and/or Specialists may be required based on the findings from this examination. Additionally, adjustments to the patient's preoperative plan of care may occur to best fit the patient's needs.     Extensive preoperative education was provided to the patient during their visit, including answering of all patient questions.      Each medication has been reviewed with the patient. With this in mind, certain medications may require specific recommendations from the prescribing provider in order to ensure positive health outcomes prior to, during, and after their surgery.       Bear Mueller, MSN, APRN, FNP-C  Nurse Practitioner for Pre-Admission Testing  [facility]  [phone]

## 2025-05-21 ENCOUNTER — HOSPITAL ENCOUNTER (OUTPATIENT)
Facility: HOSPITAL | Age: 80
Discharge: HOME | End: 2025-05-24
Payer: COMMERCIAL

## 2025-05-21 VITALS
BODY MASS INDEX: 37.11 KG/M2 | OXYGEN SATURATION: 95 % | HEIGHT: 73 IN | HEART RATE: 76 BPM | TEMPERATURE: 98.9 F | DIASTOLIC BLOOD PRESSURE: 86 MMHG | SYSTOLIC BLOOD PRESSURE: 177 MMHG | WEIGHT: 280 LBS | RESPIRATION RATE: 16 BRPM

## 2025-05-21 LAB
ALBUMIN SERPL-MCNC: 3.6 G/DL (ref 3.5–5)
ALBUMIN/GLOB SERPL: 1.3 (ref 1.1–2.2)
ALP SERPL-CCNC: 84 U/L (ref 45–117)
ALT SERPL-CCNC: 25 U/L (ref 12–78)
ANION GAP SERPL CALC-SCNC: 4 MMOL/L (ref 2–12)
APPEARANCE UR: CLEAR
AST SERPL-CCNC: 14 U/L (ref 15–37)
BACTERIA URNS QL MICRO: NEGATIVE /HPF
BASOPHILS # BLD: 0.03 K/UL (ref 0–0.1)
BASOPHILS NFR BLD: 0.3 % (ref 0–1)
BILIRUB SERPL-MCNC: 0.8 MG/DL (ref 0.2–1)
BILIRUB UR QL: NEGATIVE
BUN SERPL-MCNC: 12 MG/DL (ref 6–20)
BUN/CREAT SERPL: 15 (ref 12–20)
CALCIUM SERPL-MCNC: 8.7 MG/DL (ref 8.5–10.1)
CHLORIDE SERPL-SCNC: 100 MMOL/L (ref 97–108)
CO2 SERPL-SCNC: 34 MMOL/L (ref 21–32)
COLOR UR: ABNORMAL
CREAT SERPL-MCNC: 0.78 MG/DL (ref 0.7–1.3)
CRP SERPL-MCNC: <0.29 MG/DL (ref 0–0.3)
DIFFERENTIAL METHOD BLD: ABNORMAL
EKG ATRIAL RATE: 77 BPM
EKG DIAGNOSIS: NORMAL
EKG P AXIS: 53 DEGREES
EKG P-R INTERVAL: 168 MS
EKG Q-T INTERVAL: 396 MS
EKG QRS DURATION: 98 MS
EKG QTC CALCULATION (BAZETT): 448 MS
EKG R AXIS: -60 DEGREES
EKG T AXIS: 0 DEGREES
EKG VENTRICULAR RATE: 77 BPM
EOSINOPHIL # BLD: 0.03 K/UL (ref 0–0.4)
EOSINOPHIL NFR BLD: 0.3 % (ref 0–7)
EPITH CASTS URNS QL MICRO: ABNORMAL /LPF
ERYTHROCYTE [DISTWIDTH] IN BLOOD BY AUTOMATED COUNT: 13.2 % (ref 11.5–14.5)
ERYTHROCYTE [SEDIMENTATION RATE] IN BLOOD: 8 MM/HR (ref 0–20)
EST. AVERAGE GLUCOSE BLD GHB EST-MCNC: 108 MG/DL
GLOBULIN SER CALC-MCNC: 2.8 G/DL (ref 2–4)
GLUCOSE SERPL-MCNC: 109 MG/DL (ref 65–100)
GLUCOSE UR STRIP.AUTO-MCNC: NEGATIVE MG/DL
HBA1C MFR BLD: 5.4 % (ref 4–5.6)
HCT VFR BLD AUTO: 41.8 % (ref 36.6–50.3)
HGB BLD-MCNC: 13.4 G/DL (ref 12.1–17)
HGB UR QL STRIP: NEGATIVE
HYALINE CASTS URNS QL MICRO: ABNORMAL /LPF (ref 0–2)
IMM GRANULOCYTES # BLD AUTO: 0.04 K/UL (ref 0–0.04)
IMM GRANULOCYTES NFR BLD AUTO: 0.4 % (ref 0–0.5)
KETONES UR QL STRIP.AUTO: NEGATIVE MG/DL
LEUKOCYTE ESTERASE UR QL STRIP.AUTO: NEGATIVE
LYMPHOCYTES # BLD: 0.83 K/UL (ref 0.8–3.5)
LYMPHOCYTES NFR BLD: 8.7 % (ref 12–49)
MCH RBC QN AUTO: 30.3 PG (ref 26–34)
MCHC RBC AUTO-ENTMCNC: 32.1 G/DL (ref 30–36.5)
MCV RBC AUTO: 94.6 FL (ref 80–99)
MONOCYTES # BLD: 0.61 K/UL (ref 0–1)
MONOCYTES NFR BLD: 6.4 % (ref 5–13)
NEUTS SEG # BLD: 7.98 K/UL (ref 1.8–8)
NEUTS SEG NFR BLD: 83.9 % (ref 32–75)
NITRITE UR QL STRIP.AUTO: NEGATIVE
NRBC # BLD: 0 K/UL (ref 0–0.01)
NRBC BLD-RTO: 0 PER 100 WBC
PH UR STRIP: 8.5 (ref 5–8)
PLATELET # BLD AUTO: 345 K/UL (ref 150–400)
PMV BLD AUTO: 9.2 FL (ref 8.9–12.9)
POTASSIUM SERPL-SCNC: 3.5 MMOL/L (ref 3.5–5.1)
PROT SERPL-MCNC: 6.4 G/DL (ref 6.4–8.2)
PROT UR STRIP-MCNC: NEGATIVE MG/DL
RBC # BLD AUTO: 4.42 M/UL (ref 4.1–5.7)
RBC #/AREA URNS HPF: ABNORMAL /HPF (ref 0–5)
SODIUM SERPL-SCNC: 138 MMOL/L (ref 136–145)
SP GR UR REFRACTOMETRY: 1.01 (ref 1–1.03)
URINE CULTURE IF INDICATED: ABNORMAL
UROBILINOGEN UR QL STRIP.AUTO: 0.2 EU/DL (ref 0.2–1)
WBC # BLD AUTO: 9.5 K/UL (ref 4.1–11.1)
WBC URNS QL MICRO: ABNORMAL /HPF (ref 0–4)

## 2025-05-21 PROCEDURE — 86140 C-REACTIVE PROTEIN: CPT

## 2025-05-21 PROCEDURE — 80053 COMPREHEN METABOLIC PANEL: CPT

## 2025-05-21 PROCEDURE — 81001 URINALYSIS AUTO W/SCOPE: CPT

## 2025-05-21 PROCEDURE — 36415 COLL VENOUS BLD VENIPUNCTURE: CPT

## 2025-05-21 PROCEDURE — 84466 ASSAY OF TRANSFERRIN: CPT

## 2025-05-21 PROCEDURE — 85652 RBC SED RATE AUTOMATED: CPT

## 2025-05-21 PROCEDURE — 83036 HEMOGLOBIN GLYCOSYLATED A1C: CPT

## 2025-05-21 PROCEDURE — 93005 ELECTROCARDIOGRAM TRACING: CPT

## 2025-05-21 PROCEDURE — 85025 COMPLETE CBC W/AUTO DIFF WBC: CPT

## 2025-05-21 PROCEDURE — 93010 ELECTROCARDIOGRAM REPORT: CPT | Performed by: STUDENT IN AN ORGANIZED HEALTH CARE EDUCATION/TRAINING PROGRAM

## 2025-05-21 RX ORDER — ASPIRIN 81 MG/1
81 TABLET ORAL
COMMUNITY

## 2025-05-21 RX ORDER — VIT C/B6/B5/MAGNESIUM/HERB 173 50-5-6-5MG
500 CAPSULE ORAL EVERY MORNING
COMMUNITY

## 2025-05-21 RX ORDER — VITAMIN B COMPLEX
1 TABLET ORAL EVERY MORNING
COMMUNITY

## 2025-05-21 RX ORDER — DILTIAZEM HYDROCHLORIDE 120 MG/1
120 CAPSULE, EXTENDED RELEASE ORAL EVERY MORNING
COMMUNITY

## 2025-05-21 RX ORDER — MULTIVIT WITH MINERALS/LUTEIN
1000 TABLET ORAL EVERY MORNING
COMMUNITY

## 2025-05-21 ASSESSMENT — ENCOUNTER SYMPTOMS
SINUS PAIN: 0
RHINORRHEA: 0
VOICE CHANGE: 0
WHEEZING: 0
SINUS PRESSURE: 0
CHEST TIGHTNESS: 0
COUGH: 0
SHORTNESS OF BREATH: 0
CONSTIPATION: 0
CHOKING: 0
STRIDOR: 0
FACIAL SWELLING: 0
NAUSEA: 0
TROUBLE SWALLOWING: 0
APNEA: 0
COLOR CHANGE: 0
SORE THROAT: 0

## 2025-05-21 ASSESSMENT — PROMIS GLOBAL HEALTH SCALE
SUM OF RESPONSES TO QUESTIONS 3, 6, 7, & 8: 15
IN GENERAL, HOW WOULD YOU RATE YOUR SATISFACTION WITH YOUR SOCIAL ACTIVITIES AND RELATIONSHIPS [ON A SCALE OF 1 (POOR) TO 5 (EXCELLENT)]?: GOOD
IN GENERAL, WOULD YOU SAY YOUR QUALITY OF LIFE IS...[ON A SCALE OF 1 (POOR) TO 5 (EXCELLENT)]: GOOD
HOW IS THE PROMIS V1.1 BEING ADMINISTERED?: PAPER
IN GENERAL, HOW WOULD YOU RATE YOUR PHYSICAL HEALTH [ON A SCALE OF 1 (POOR) TO 5 (EXCELLENT)]?: GOOD
IN THE PAST 7 DAYS, HOW WOULD YOU RATE YOUR FATIGUE ON AVERAGE [ON A SCALE FROM 1 (NONE) TO 5 (VERY SEVERE)]?: MODERATE
SUM OF RESPONSES TO QUESTIONS 2, 4, 5, & 10: 14
IN THE PAST 7 DAYS, HOW OFTEN HAVE YOU BEEN BOTHERED BY EMOTIONAL PROBLEMS, SUCH AS FEELING ANXIOUS, DEPRESSED, OR IRRITABLE [ON A SCALE FROM 1 (NEVER) TO 5 (ALWAYS)]?: NEVER
WHO IS THE PERSON COMPLETING THE PROMIS V1.1 SURVEY?: SURROGATE
IN GENERAL, HOW WOULD YOU RATE YOUR MENTAL HEALTH, INCLUDING YOUR MOOD AND YOUR ABILITY TO THINK [ON A SCALE OF 1 (POOR) TO 5 (EXCELLENT)]?: GOOD
IN GENERAL, PLEASE RATE HOW WELL YOU CARRY OUT YOUR USUAL SOCIAL ACTIVITIES (INCLUDES ACTIVITIES AT HOME, AT WORK, AND IN YOUR COMMUNITY, AND RESPONSIBILITIES AS A PARENT, CHILD, SPOUSE, EMPLOYEE, FRIEND, ETC) [ON A SCALE OF 1 (POOR) TO 5 (EXCELLENT)]?: FAIR
IN THE PAST 7 DAYS, HOW WOULD YOU RATE YOUR PAIN ON AVERAGE [ON A SCALE FROM 0 (NO PAIN) TO 10 (WORST IMAGINABLE PAIN)]?: 7
IN GENERAL, WOULD YOU SAY YOUR HEALTH IS...[ON A SCALE OF 1 (POOR) TO 5 (EXCELLENT)]: GOOD
TO WHAT EXTENT ARE YOU ABLE TO CARRY OUT YOUR EVERYDAY PHYSICAL ACTIVITIES SUCH AS WALKING, CLIMBING STAIRS, CARRYING GROCERIES, OR MOVING A CHAIR [ON A SCALE OF 1 (NOT AT ALL) TO 5 (COMPLETELY)]?: A LITTLE

## 2025-05-21 ASSESSMENT — KOOS JR
GOING UP OR DOWN STAIRS: SEVERE
STANDING UPRIGHT: MODERATE
RISING FROM SITTING: MODERATE
KOOS JR TOTAL INTERVAL SCORE: 50.012
HOW SEVERE IS YOUR KNEE STIFFNESS AFTER FIRST WAKING IN MORNING: MODERATE
TWISING OR PIVOTING ON KNEE: MODERATE
BENDING TO THE FLOOR TO PICK UP OBJECT: MODERATE
STRAIGHTENING KNEE FULLY: MODERATE

## 2025-05-21 NOTE — PERIOP NOTE
1030 5/21/25: S/W April in Dr. Verma's office, alerted team that patient has self stopped Xarelto 2 weeks ago. Also notified team that patient requests no spinal.

## 2025-05-21 NOTE — DISCHARGE INSTRUCTIONS
[facility]                   [address] [place] [address]   MAIN OR                               [phone]   MAIN PRE OP                       [phone]                                                                                AMBULATORY PRE OP       [phone]  PRE-ADMISSION TESTING [phone]     Surgery Date:  Thursday, June 5, 2025         INSTRUCTIONS BEFORE YOUR SURGERY   Arrival [facility] staff will call you between 3 and 7pm the day before your surgery with your arrival time.   (If your surgery is on a Monday, we will call you the Friday before.)    Call [phone] after 7pm Monday-Friday if you did not receive this call.    Free  parking is available from 7:00 AM - 5:00 PM.    Come through the Main Entrance of the hospital.  Take the elevators on the left side of the lobby to the 2nd floor. Proceed to the Admitting Desk to you right for check in.   Have your insurance card, photo ID, and any copayment (if needed).   Food   and   Drink No food or drink (gum, mints, coffee, juice, etc) after midnight the night before surgery.   No alcohol (beer, wine, liquor) or marijuana 24 hours before and after surgery.    You may drink WATER ONLY up until 2 hours prior to your surgery time.   Please do not add anything to your water as this may result in your surgery being postponed.      Pre- operative  Medication Instructions   MEDICATIONS TO TAKE THE MORNING OF SURGERY WITH A SIP OF WATER:     Diltiazem  Hydralazine  Synthroid    DO NOT TAKE THE FOLLOWING MEDICATIONS THE MORNING OF SURGERY:           Benicar         SPECIAL INSTRUCTIONS:    Tylenol may be taken as needed.   Medications  To  STOP      7 days before surgery Non-Steroidal anti-inflammatory Drugs (NSAID's): for example, Ibuprofen (Advil, Motrin), Naproxen (Aleve).  GLP-1 medications taken for weight loss/ diabetes.   Herbal supplements, vitamins, and fish oil.  Other:  Vitamin D3, Vitamin

## 2025-05-21 NOTE — PERIOP NOTE
1000 5/21/25: During PAT appointment today, patient reports he has stopped taking Xarelto on his own about 2 weeks ago. Pt reports he has notified his cardiologist of this.  Patient also states he was told by cardiologist to hold aspirin 81 mg for 5 days before surgery.     LVM with NATHANIEL Moses NP at [facility] notifying of patient's report of self stopping Xarelto 2 weeks ago.    Med plan for ASA faxed to [facility]/ NATHANIEL Moses NP.      DOS: 6/5/25

## 2025-05-22 LAB
BACTERIA SPEC CULT: NORMAL
BACTERIA SPEC CULT: NORMAL
SERVICE CMNT-IMP: NORMAL

## 2025-05-23 LAB — TRANSFERRIN SERPL-MCNC: 255 MG/DL (ref 177–329)

## 2025-05-23 NOTE — PERIOP NOTE
Aspirin instructions received. Spoke with patient and he stated that he was already told what to do with his aspirin from his cardiologist. Confirmed with patient instructions. Patient voiced he was told same instructions and had no additional questions.

## 2025-06-04 ENCOUNTER — ANESTHESIA EVENT (OUTPATIENT)
Facility: HOSPITAL | Age: 80
End: 2025-06-04
Payer: COMMERCIAL

## 2025-06-04 NOTE — PERIOP NOTE
Hello,     You are scheduled to have surgery tomorrow at [facility].     We would like for you to arrive at  0800 am  We are located on the second floor, [address]. You will check-in at the registration desk located outside the elevators on the second floor prior to proceeding to [address].  Remember nothing to eat or drink after midnight. If you need to take medications the morning of surgery, please take with a few sips of water.   Wear loose, comfortable clothing and leave all your jewelry at home.   You may bring your cell phone with you.  One family member will be allowed in the pre-op area once you are dressed and your IV has been started.   You will need someone to drive you home and be with you for 24 hours post-anesthesia.     We look forward to seeing you! Call [phone] for questions after hours and [phone] between 5:30AM and 6PM.     Thanks!    [facility] PREOP TEAM

## 2025-06-04 NOTE — PERIOP NOTE
Left message with Dr. Verma's team that they aware that patient is requesting to not have a spinal anesthetic for surgery.

## 2025-06-05 ENCOUNTER — HOSPITAL ENCOUNTER (OUTPATIENT)
Facility: HOSPITAL | Age: 80
Setting detail: OBSERVATION
Discharge: HOME | End: 2025-06-06
Attending: ORTHOPAEDIC SURGERY | Admitting: ORTHOPAEDIC SURGERY
Payer: COMMERCIAL

## 2025-06-05 ENCOUNTER — ANESTHESIA (OUTPATIENT)
Facility: HOSPITAL | Age: 80
End: 2025-06-05
Payer: COMMERCIAL

## 2025-06-05 ENCOUNTER — APPOINTMENT (OUTPATIENT)
Facility: HOSPITAL | Age: 80
End: 2025-06-05
Attending: ORTHOPAEDIC SURGERY
Payer: COMMERCIAL

## 2025-06-05 DIAGNOSIS — M17.11 PRIMARY OSTEOARTHRITIS OF RIGHT KNEE: Primary | ICD-10-CM

## 2025-06-05 PROCEDURE — 2500000003 HC RX 250 WO HCPCS: Performed by: ORTHOPAEDIC SURGERY

## 2025-06-05 PROCEDURE — 3600000014 HC SURGERY LEVEL 4 ADDTL 15MIN: Performed by: ORTHOPAEDIC SURGERY

## 2025-06-05 PROCEDURE — P9045 ALBUMIN (HUMAN), 5%, 250 ML: HCPCS | Performed by: NURSE ANESTHETIST, CERTIFIED REGISTERED

## 2025-06-05 PROCEDURE — 73560 X-RAY EXAM OF KNEE 1 OR 2: CPT

## 2025-06-05 PROCEDURE — 6370000000 HC RX 637 (ALT 250 FOR IP): Performed by: ANESTHESIOLOGY

## 2025-06-05 PROCEDURE — 2580000003 HC RX 258: Performed by: PHYSICIAN ASSISTANT

## 2025-06-05 PROCEDURE — 3700000000 HC ANESTHESIA ATTENDED CARE: Performed by: ORTHOPAEDIC SURGERY

## 2025-06-05 PROCEDURE — 7100000001 HC PACU RECOVERY - ADDTL 15 MIN: Performed by: ORTHOPAEDIC SURGERY

## 2025-06-05 PROCEDURE — 7100000000 HC PACU RECOVERY - FIRST 15 MIN: Performed by: ORTHOPAEDIC SURGERY

## 2025-06-05 PROCEDURE — G0378 HOSPITAL OBSERVATION PER HR: HCPCS

## 2025-06-05 PROCEDURE — 6360000002 HC RX W HCPCS: Performed by: ORTHOPAEDIC SURGERY

## 2025-06-05 PROCEDURE — 6370000000 HC RX 637 (ALT 250 FOR IP): Performed by: ORTHOPAEDIC SURGERY

## 2025-06-05 PROCEDURE — C1776 JOINT DEVICE (IMPLANTABLE): HCPCS | Performed by: ORTHOPAEDIC SURGERY

## 2025-06-05 PROCEDURE — 3700000001 HC ADD 15 MINUTES (ANESTHESIA): Performed by: ORTHOPAEDIC SURGERY

## 2025-06-05 PROCEDURE — 2580000003 HC RX 258: Performed by: NURSE ANESTHETIST, CERTIFIED REGISTERED

## 2025-06-05 PROCEDURE — 6360000002 HC RX W HCPCS: Performed by: ANESTHESIOLOGY

## 2025-06-05 PROCEDURE — 2580000003 HC RX 258: Performed by: ANESTHESIOLOGY

## 2025-06-05 PROCEDURE — 97116 GAIT TRAINING THERAPY: CPT

## 2025-06-05 PROCEDURE — 2700000000 HC OXYGEN THERAPY PER DAY

## 2025-06-05 PROCEDURE — 2500000003 HC RX 250 WO HCPCS: Performed by: PHYSICIAN ASSISTANT

## 2025-06-05 PROCEDURE — 97161 PT EVAL LOW COMPLEX 20 MIN: CPT

## 2025-06-05 PROCEDURE — 64999 UNLISTED PX NERVOUS SYSTEM: CPT | Performed by: ANESTHESIOLOGY

## 2025-06-05 PROCEDURE — 6370000000 HC RX 637 (ALT 250 FOR IP): Performed by: PHYSICIAN ASSISTANT

## 2025-06-05 PROCEDURE — 6360000002 HC RX W HCPCS: Performed by: NURSE ANESTHETIST, CERTIFIED REGISTERED

## 2025-06-05 PROCEDURE — 2500000003 HC RX 250 WO HCPCS: Performed by: NURSE ANESTHETIST, CERTIFIED REGISTERED

## 2025-06-05 PROCEDURE — 3600000004 HC SURGERY LEVEL 4 BASE: Performed by: ORTHOPAEDIC SURGERY

## 2025-06-05 PROCEDURE — 6360000002 HC RX W HCPCS: Performed by: PHYSICIAN ASSISTANT

## 2025-06-05 PROCEDURE — 2720000010 HC SURG SUPPLY STERILE: Performed by: ORTHOPAEDIC SURGERY

## 2025-06-05 PROCEDURE — 2709999900 HC NON-CHARGEABLE SUPPLY: Performed by: ORTHOPAEDIC SURGERY

## 2025-06-05 PROCEDURE — 94761 N-INVAS EAR/PLS OXIMETRY MLT: CPT

## 2025-06-05 DEVICE — CRUCIATE RETAINING FEMORAL
Type: IMPLANTABLE DEVICE | Site: KNEE | Status: FUNCTIONAL
Brand: TRIATHLON

## 2025-06-05 DEVICE — COMPONENT TOT KNEE CAPPED PRIMARY K2STRYKER] STRYKER CORP]: Type: IMPLANTABLE DEVICE | Status: FUNCTIONAL

## 2025-06-05 DEVICE — TIBIAL COMPONENT
Type: IMPLANTABLE DEVICE | Site: KNEE | Status: FUNCTIONAL
Brand: TRIATHLON

## 2025-06-05 DEVICE — TIBIAL BEARING INSERT - CR
Type: IMPLANTABLE DEVICE | Site: KNEE | Status: FUNCTIONAL
Brand: TRIATHLON

## 2025-06-05 DEVICE — PATELLA
Type: IMPLANTABLE DEVICE | Site: KNEE | Status: FUNCTIONAL
Brand: TRIATHLON

## 2025-06-05 RX ORDER — FAMOTIDINE 20 MG/1
20 TABLET, FILM COATED ORAL 2 TIMES DAILY
Status: DISCONTINUED | OUTPATIENT
Start: 2025-06-05 | End: 2025-06-06 | Stop reason: HOSPADM

## 2025-06-05 RX ORDER — SODIUM CHLORIDE, SODIUM LACTATE, POTASSIUM CHLORIDE, CALCIUM CHLORIDE 600; 310; 30; 20 MG/100ML; MG/100ML; MG/100ML; MG/100ML
INJECTION, SOLUTION INTRAVENOUS
Status: DISCONTINUED | OUTPATIENT
Start: 2025-06-05 | End: 2025-06-05 | Stop reason: SDUPTHER

## 2025-06-05 RX ORDER — DIPHENHYDRAMINE HCL 25 MG
25 CAPSULE ORAL EVERY 6 HOURS PRN
Status: DISCONTINUED | OUTPATIENT
Start: 2025-06-05 | End: 2025-06-06 | Stop reason: HOSPADM

## 2025-06-05 RX ORDER — HYDRALAZINE HYDROCHLORIDE 25 MG/1
100 TABLET, FILM COATED ORAL 3 TIMES DAILY
Status: DISCONTINUED | OUTPATIENT
Start: 2025-06-05 | End: 2025-06-06 | Stop reason: HOSPADM

## 2025-06-05 RX ORDER — FENTANYL CITRATE 50 UG/ML
INJECTION, SOLUTION INTRAMUSCULAR; INTRAVENOUS
Status: DISCONTINUED | OUTPATIENT
Start: 2025-06-05 | End: 2025-06-05 | Stop reason: SDUPTHER

## 2025-06-05 RX ORDER — UBIDECARENONE 100 MG
100 CAPSULE ORAL DAILY
Status: DISCONTINUED | OUTPATIENT
Start: 2025-06-06 | End: 2025-06-06 | Stop reason: HOSPADM

## 2025-06-05 RX ORDER — DEXMEDETOMIDINE HYDROCHLORIDE 100 UG/ML
INJECTION, SOLUTION INTRAVENOUS
Status: DISCONTINUED | OUTPATIENT
Start: 2025-06-05 | End: 2025-06-05 | Stop reason: SDUPTHER

## 2025-06-05 RX ORDER — POLYETHYLENE GLYCOL 3350 17 G/17G
17 POWDER, FOR SOLUTION ORAL DAILY
Status: DISCONTINUED | OUTPATIENT
Start: 2025-06-05 | End: 2025-06-06 | Stop reason: HOSPADM

## 2025-06-05 RX ORDER — MULTIVITAMIN
1 TABLET ORAL EVERY MORNING
Status: DISCONTINUED | OUTPATIENT
Start: 2025-06-06 | End: 2025-06-05

## 2025-06-05 RX ORDER — DILTIAZEM HYDROCHLORIDE 60 MG/1
120 CAPSULE, EXTENDED RELEASE ORAL EVERY MORNING
Status: DISCONTINUED | OUTPATIENT
Start: 2025-06-06 | End: 2025-06-06 | Stop reason: HOSPADM

## 2025-06-05 RX ORDER — BISACODYL 5 MG/1
5 TABLET, DELAYED RELEASE ORAL DAILY PRN
Status: DISCONTINUED | OUTPATIENT
Start: 2025-06-05 | End: 2025-06-06 | Stop reason: HOSPADM

## 2025-06-05 RX ORDER — PREGABALIN 75 MG/1
75 CAPSULE ORAL ONCE
Status: COMPLETED | OUTPATIENT
Start: 2025-06-05 | End: 2025-06-05

## 2025-06-05 RX ORDER — ASCORBIC ACID 500 MG
1000 TABLET ORAL EVERY MORNING
Status: DISCONTINUED | OUTPATIENT
Start: 2025-06-06 | End: 2025-06-06 | Stop reason: HOSPADM

## 2025-06-05 RX ORDER — ONDANSETRON 2 MG/ML
4 INJECTION INTRAMUSCULAR; INTRAVENOUS
Status: DISCONTINUED | OUTPATIENT
Start: 2025-06-05 | End: 2025-06-05 | Stop reason: HOSPADM

## 2025-06-05 RX ORDER — MULTIVITAMIN WITH IRON
1 TABLET ORAL DAILY
Status: DISCONTINUED | OUTPATIENT
Start: 2025-06-06 | End: 2025-06-06 | Stop reason: HOSPADM

## 2025-06-05 RX ORDER — DIPHENHYDRAMINE HYDROCHLORIDE 50 MG/ML
25 INJECTION, SOLUTION INTRAMUSCULAR; INTRAVENOUS EVERY 6 HOURS PRN
Status: DISCONTINUED | OUTPATIENT
Start: 2025-06-05 | End: 2025-06-06 | Stop reason: HOSPADM

## 2025-06-05 RX ORDER — DEXAMETHASONE SODIUM PHOSPHATE 4 MG/ML
INJECTION, SOLUTION INTRA-ARTICULAR; INTRALESIONAL; INTRAMUSCULAR; INTRAVENOUS; SOFT TISSUE
Status: DISCONTINUED | OUTPATIENT
Start: 2025-06-05 | End: 2025-06-05 | Stop reason: SDUPTHER

## 2025-06-05 RX ORDER — KETOROLAC TROMETHAMINE 15 MG/ML
15 INJECTION, SOLUTION INTRAMUSCULAR; INTRAVENOUS
Status: COMPLETED | OUTPATIENT
Start: 2025-06-05 | End: 2025-06-05

## 2025-06-05 RX ORDER — ONDANSETRON 4 MG/1
4 TABLET, ORALLY DISINTEGRATING ORAL EVERY 8 HOURS PRN
Qty: 10 TABLET | Refills: 0 | Status: SHIPPED | OUTPATIENT
Start: 2025-06-05

## 2025-06-05 RX ORDER — SODIUM CHLORIDE 0.9 % (FLUSH) 0.9 %
5-40 SYRINGE (ML) INJECTION PRN
Status: DISCONTINUED | OUTPATIENT
Start: 2025-06-05 | End: 2025-06-06 | Stop reason: HOSPADM

## 2025-06-05 RX ORDER — SODIUM CHLORIDE 0.9 % (FLUSH) 0.9 %
5-40 SYRINGE (ML) INJECTION EVERY 12 HOURS SCHEDULED
Status: DISCONTINUED | OUTPATIENT
Start: 2025-06-05 | End: 2025-06-06 | Stop reason: HOSPADM

## 2025-06-05 RX ORDER — CELECOXIB 100 MG/1
100 CAPSULE ORAL ONCE
Status: COMPLETED | OUTPATIENT
Start: 2025-06-05 | End: 2025-06-05

## 2025-06-05 RX ORDER — LEVOTHYROXINE SODIUM 75 UG/1
150 TABLET ORAL EVERY OTHER DAY
Status: DISCONTINUED | OUTPATIENT
Start: 2025-06-07 | End: 2025-06-06 | Stop reason: HOSPADM

## 2025-06-05 RX ORDER — OXYCODONE HYDROCHLORIDE 5 MG/1
5 TABLET ORAL
Status: COMPLETED | OUTPATIENT
Start: 2025-06-05 | End: 2025-06-05

## 2025-06-05 RX ORDER — ONDANSETRON 4 MG/1
4 TABLET, ORALLY DISINTEGRATING ORAL EVERY 8 HOURS PRN
Status: DISCONTINUED | OUTPATIENT
Start: 2025-06-05 | End: 2025-06-06 | Stop reason: HOSPADM

## 2025-06-05 RX ORDER — VIT C/E/ZN/COPPR/LUTEIN/ZEAXAN 60 MG-6 MG
1 CAPSULE ORAL
Status: DISCONTINUED | OUTPATIENT
Start: 2025-06-05 | End: 2025-06-05

## 2025-06-05 RX ORDER — DIPHENHYDRAMINE HYDROCHLORIDE 50 MG/ML
12.5 INJECTION, SOLUTION INTRAMUSCULAR; INTRAVENOUS
Status: DISCONTINUED | OUTPATIENT
Start: 2025-06-05 | End: 2025-06-05 | Stop reason: HOSPADM

## 2025-06-05 RX ORDER — SODIUM CHLORIDE 9 MG/ML
INJECTION, SOLUTION INTRAVENOUS PRN
Status: DISCONTINUED | OUTPATIENT
Start: 2025-06-05 | End: 2025-06-06 | Stop reason: HOSPADM

## 2025-06-05 RX ORDER — FENTANYL CITRATE 50 UG/ML
25 INJECTION, SOLUTION INTRAMUSCULAR; INTRAVENOUS EVERY 5 MIN PRN
Status: DISCONTINUED | OUTPATIENT
Start: 2025-06-05 | End: 2025-06-05 | Stop reason: HOSPADM

## 2025-06-05 RX ORDER — SODIUM CHLORIDE, SODIUM LACTATE, POTASSIUM CHLORIDE, CALCIUM CHLORIDE 600; 310; 30; 20 MG/100ML; MG/100ML; MG/100ML; MG/100ML
INJECTION, SOLUTION INTRAVENOUS CONTINUOUS
Status: DISCONTINUED | OUTPATIENT
Start: 2025-06-05 | End: 2025-06-05 | Stop reason: HOSPADM

## 2025-06-05 RX ORDER — MULTIVITAMIN WITH IRON
1000 TABLET ORAL DAILY
Status: DISCONTINUED | OUTPATIENT
Start: 2025-06-05 | End: 2025-06-06 | Stop reason: HOSPADM

## 2025-06-05 RX ORDER — LIDOCAINE HYDROCHLORIDE 10 MG/ML
1 INJECTION, SOLUTION EPIDURAL; INFILTRATION; INTRACAUDAL; PERINEURAL
Status: DISCONTINUED | OUTPATIENT
Start: 2025-06-05 | End: 2025-06-05 | Stop reason: HOSPADM

## 2025-06-05 RX ORDER — ONDANSETRON 2 MG/ML
4 INJECTION INTRAMUSCULAR; INTRAVENOUS EVERY 6 HOURS PRN
Status: DISCONTINUED | OUTPATIENT
Start: 2025-06-05 | End: 2025-06-06 | Stop reason: HOSPADM

## 2025-06-05 RX ORDER — ACETAMINOPHEN 325 MG/1
650 TABLET ORAL EVERY 6 HOURS
Status: DISCONTINUED | OUTPATIENT
Start: 2025-06-05 | End: 2025-06-06 | Stop reason: HOSPADM

## 2025-06-05 RX ORDER — ROPIVACAINE HYDROCHLORIDE 2 MG/ML
INJECTION, SOLUTION EPIDURAL; INFILTRATION; PERINEURAL
Status: DISCONTINUED | OUTPATIENT
Start: 2025-06-05 | End: 2025-06-05 | Stop reason: SDUPTHER

## 2025-06-05 RX ORDER — OXYCODONE HYDROCHLORIDE 5 MG/1
5 TABLET ORAL
Status: DISCONTINUED | OUTPATIENT
Start: 2025-06-05 | End: 2025-06-05 | Stop reason: HOSPADM

## 2025-06-05 RX ORDER — FENTANYL CITRATE 50 UG/ML
100 INJECTION, SOLUTION INTRAMUSCULAR; INTRAVENOUS
Status: DISCONTINUED | OUTPATIENT
Start: 2025-06-05 | End: 2025-06-05 | Stop reason: HOSPADM

## 2025-06-05 RX ORDER — ALBUMIN HUMAN 50 G/1000ML
SOLUTION INTRAVENOUS
Status: DISCONTINUED | OUTPATIENT
Start: 2025-06-05 | End: 2025-06-05 | Stop reason: SDUPTHER

## 2025-06-05 RX ORDER — OXYCODONE HYDROCHLORIDE 5 MG/1
5 TABLET ORAL EVERY 4 HOURS PRN
Qty: 42 TABLET | Refills: 0 | Status: SHIPPED | OUTPATIENT
Start: 2025-06-05 | End: 2025-06-12

## 2025-06-05 RX ORDER — SODIUM CHLORIDE 9 MG/ML
INJECTION, SOLUTION INTRAVENOUS CONTINUOUS
Status: DISCONTINUED | OUTPATIENT
Start: 2025-06-05 | End: 2025-06-06 | Stop reason: HOSPADM

## 2025-06-05 RX ORDER — FAMOTIDINE 10 MG/ML
INJECTION, SOLUTION INTRAVENOUS
Status: DISCONTINUED | OUTPATIENT
Start: 2025-06-05 | End: 2025-06-05 | Stop reason: SDUPTHER

## 2025-06-05 RX ORDER — ONDANSETRON 2 MG/ML
INJECTION INTRAMUSCULAR; INTRAVENOUS
Status: DISCONTINUED | OUTPATIENT
Start: 2025-06-05 | End: 2025-06-05 | Stop reason: SDUPTHER

## 2025-06-05 RX ORDER — ACETAMINOPHEN 325 MG/1
975 TABLET ORAL ONCE
Status: DISCONTINUED | OUTPATIENT
Start: 2025-06-05 | End: 2025-06-05 | Stop reason: HOSPADM

## 2025-06-05 RX ORDER — BISACODYL 10 MG
10 SUPPOSITORY, RECTAL RECTAL DAILY PRN
Status: DISCONTINUED | OUTPATIENT
Start: 2025-06-05 | End: 2025-06-06 | Stop reason: HOSPADM

## 2025-06-05 RX ORDER — ASPIRIN 81 MG/1
81 TABLET ORAL 2 TIMES DAILY
Status: DISCONTINUED | OUTPATIENT
Start: 2025-06-05 | End: 2025-06-06 | Stop reason: HOSPADM

## 2025-06-05 RX ORDER — IBUPROFEN 800 MG/1
800 TABLET, FILM COATED ORAL EVERY 8 HOURS PRN
Qty: 60 TABLET | Refills: 0 | Status: SHIPPED | OUTPATIENT
Start: 2025-06-05 | End: 2025-06-06 | Stop reason: HOSPADM

## 2025-06-05 RX ORDER — LANOLIN ALCOHOL/MO/W.PET/CERES
200 CREAM (GRAM) TOPICAL
Status: DISCONTINUED | OUTPATIENT
Start: 2025-06-05 | End: 2025-06-06 | Stop reason: HOSPADM

## 2025-06-05 RX ORDER — VITS A,C,E/LUTEIN/MINERALS 300MCG-200
1 TABLET ORAL
Status: DISCONTINUED | OUTPATIENT
Start: 2025-06-05 | End: 2025-06-06 | Stop reason: HOSPADM

## 2025-06-05 RX ORDER — NALOXONE HYDROCHLORIDE 0.4 MG/ML
INJECTION, SOLUTION INTRAMUSCULAR; INTRAVENOUS; SUBCUTANEOUS PRN
Status: DISCONTINUED | OUTPATIENT
Start: 2025-06-05 | End: 2025-06-05 | Stop reason: HOSPADM

## 2025-06-05 RX ORDER — CETIRIZINE HYDROCHLORIDE 10 MG/1
10 TABLET ORAL
Status: DISCONTINUED | OUTPATIENT
Start: 2025-06-05 | End: 2025-06-06 | Stop reason: HOSPADM

## 2025-06-05 RX ORDER — OXYCODONE HCL 10 MG/1
10 TABLET, FILM COATED, EXTENDED RELEASE ORAL ONCE
Status: COMPLETED | OUTPATIENT
Start: 2025-06-05 | End: 2025-06-05

## 2025-06-05 RX ORDER — HYDROMORPHONE HYDROCHLORIDE 1 MG/ML
1 INJECTION, SOLUTION INTRAMUSCULAR; INTRAVENOUS; SUBCUTANEOUS
Status: DISCONTINUED | OUTPATIENT
Start: 2025-06-05 | End: 2025-06-06 | Stop reason: HOSPADM

## 2025-06-05 RX ORDER — ACETAMINOPHEN 325 MG/1
975 TABLET ORAL ONCE
Status: COMPLETED | OUTPATIENT
Start: 2025-06-05 | End: 2025-06-05

## 2025-06-05 RX ORDER — VITAMIN B COMPLEX
1 TABLET ORAL EVERY MORNING
Status: DISCONTINUED | OUTPATIENT
Start: 2025-06-06 | End: 2025-06-05

## 2025-06-05 RX ORDER — OXYCODONE HYDROCHLORIDE 5 MG/1
5 TABLET ORAL
Status: DISCONTINUED | OUTPATIENT
Start: 2025-06-05 | End: 2025-06-06 | Stop reason: HOSPADM

## 2025-06-05 RX ORDER — PROPOFOL 10 MG/ML
INJECTION, EMULSION INTRAVENOUS
Status: DISCONTINUED | OUTPATIENT
Start: 2025-06-05 | End: 2025-06-05 | Stop reason: SDUPTHER

## 2025-06-05 RX ORDER — OXYCODONE HYDROCHLORIDE 5 MG/1
10 TABLET ORAL
Status: DISCONTINUED | OUTPATIENT
Start: 2025-06-05 | End: 2025-06-06 | Stop reason: HOSPADM

## 2025-06-05 RX ORDER — ACETAMINOPHEN 325 MG/1
650 TABLET ORAL EVERY 4 HOURS
Qty: 120 TABLET | Refills: 1 | Status: SHIPPED | OUTPATIENT
Start: 2025-06-05 | End: 2025-07-05

## 2025-06-05 RX ADMIN — HYDRALAZINE HYDROCHLORIDE 100 MG: 25 TABLET ORAL at 16:17

## 2025-06-05 RX ADMIN — OXYCODONE 10 MG: 5 TABLET ORAL at 18:56

## 2025-06-05 RX ADMIN — SODIUM CHLORIDE, POTASSIUM CHLORIDE, SODIUM LACTATE AND CALCIUM CHLORIDE: 600; 310; 30; 20 INJECTION, SOLUTION INTRAVENOUS at 09:37

## 2025-06-05 RX ADMIN — PROPOFOL 25 MCG/KG/MIN: 10 INJECTION, EMULSION INTRAVENOUS at 10:28

## 2025-06-05 RX ADMIN — ASPIRIN 81 MG: 81 TABLET, COATED ORAL at 21:06

## 2025-06-05 RX ADMIN — DIPHENHYDRAMINE HYDROCHLORIDE 25 MG: 25 CAPSULE ORAL at 21:06

## 2025-06-05 RX ADMIN — ALBUMIN (HUMAN) 250 ML: 12.5 SOLUTION INTRAVENOUS at 11:06

## 2025-06-05 RX ADMIN — PROPOFOL 50 MG: 10 INJECTION, EMULSION INTRAVENOUS at 10:51

## 2025-06-05 RX ADMIN — DEXMEDETOMIDINE 8 MCG: 100 INJECTION, SOLUTION INTRAVENOUS at 11:17

## 2025-06-05 RX ADMIN — ACETAMINOPHEN 650 MG: 325 TABLET ORAL at 16:17

## 2025-06-05 RX ADMIN — ACETAMINOPHEN 975 MG: 325 TABLET ORAL at 08:38

## 2025-06-05 RX ADMIN — DEXMEDETOMIDINE 8 MCG: 100 INJECTION, SOLUTION INTRAVENOUS at 11:24

## 2025-06-05 RX ADMIN — OXYCODONE HYDROCHLORIDE 10 MG: 10 TABLET, FILM COATED, EXTENDED RELEASE ORAL at 08:39

## 2025-06-05 RX ADMIN — KETOROLAC TROMETHAMINE 15 MG: 15 INJECTION, SOLUTION INTRAMUSCULAR; INTRAVENOUS at 12:41

## 2025-06-05 RX ADMIN — CELECOXIB 100 MG: 100 CAPSULE ORAL at 08:39

## 2025-06-05 RX ADMIN — POLYETHYLENE GLYCOL 3350 17 G: 17 POWDER, FOR SOLUTION ORAL at 16:38

## 2025-06-05 RX ADMIN — HYDRALAZINE HYDROCHLORIDE 100 MG: 25 TABLET ORAL at 21:06

## 2025-06-05 RX ADMIN — FENTANYL CITRATE 100 MCG: 50 INJECTION, SOLUTION INTRAMUSCULAR; INTRAVENOUS at 10:51

## 2025-06-05 RX ADMIN — CEFAZOLIN 3000 MG: 3 INJECTION, POWDER, FOR SOLUTION INTRAVENOUS at 19:03

## 2025-06-05 RX ADMIN — FENTANYL CITRATE 100 MCG: 50 INJECTION, SOLUTION INTRAMUSCULAR; INTRAVENOUS at 09:31

## 2025-06-05 RX ADMIN — OXYCODONE HYDROCHLORIDE 5 MG: 5 TABLET ORAL at 14:10

## 2025-06-05 RX ADMIN — FAMOTIDINE 20 MG: 10 INJECTION INTRAVENOUS at 10:54

## 2025-06-05 RX ADMIN — PROPOFOL 150 MG: 10 INJECTION, EMULSION INTRAVENOUS at 10:33

## 2025-06-05 RX ADMIN — SODIUM CHLORIDE, PRESERVATIVE FREE 10 ML: 5 INJECTION INTRAVENOUS at 21:07

## 2025-06-05 RX ADMIN — PREGABALIN 75 MG: 75 CAPSULE ORAL at 08:39

## 2025-06-05 RX ADMIN — HYDROMORPHONE HYDROCHLORIDE 0.5 MG: 1 INJECTION, SOLUTION INTRAMUSCULAR; INTRAVENOUS; SUBCUTANEOUS at 13:23

## 2025-06-05 RX ADMIN — FAMOTIDINE 20 MG: 20 TABLET, FILM COATED ORAL at 21:06

## 2025-06-05 RX ADMIN — ACETAMINOPHEN 650 MG: 325 TABLET ORAL at 21:06

## 2025-06-05 RX ADMIN — DEXMEDETOMIDINE 8 MCG: 100 INJECTION, SOLUTION INTRAVENOUS at 10:58

## 2025-06-05 RX ADMIN — DEXAMETHASONE SODIUM PHOSPHATE 8 MG: 4 INJECTION, SOLUTION INTRAMUSCULAR; INTRAVENOUS at 10:54

## 2025-06-05 RX ADMIN — CETIRIZINE HYDROCHLORIDE 10 MG: 10 TABLET, FILM COATED ORAL at 21:07

## 2025-06-05 RX ADMIN — SODIUM CHLORIDE: 0.9 INJECTION, SOLUTION INTRAVENOUS at 16:38

## 2025-06-05 RX ADMIN — ROPIVACAINE HYDROCHLORIDE 20 ML: 2 INJECTION, SOLUTION EPIDURAL; INFILTRATION at 09:37

## 2025-06-05 RX ADMIN — CEFAZOLIN SODIUM 2000 MG: 1 POWDER, FOR SOLUTION INTRAMUSCULAR; INTRAVENOUS at 10:36

## 2025-06-05 RX ADMIN — CYANOCOBALAMIN TAB 500 MCG 1000 MCG: 500 TAB at 16:18

## 2025-06-05 RX ADMIN — FENTANYL CITRATE 100 MCG: 50 INJECTION, SOLUTION INTRAMUSCULAR; INTRAVENOUS at 10:31

## 2025-06-05 RX ADMIN — HYDROMORPHONE HYDROCHLORIDE 0.5 MG: 1 INJECTION, SOLUTION INTRAMUSCULAR; INTRAVENOUS; SUBCUTANEOUS at 12:42

## 2025-06-05 RX ADMIN — HYDROMORPHONE HYDROCHLORIDE 0.5 MG: 1 INJECTION, SOLUTION INTRAMUSCULAR; INTRAVENOUS; SUBCUTANEOUS at 16:18

## 2025-06-05 RX ADMIN — ONDANSETRON HYDROCHLORIDE 4 MG: 2 SOLUTION INTRAMUSCULAR; INTRAVENOUS at 12:12

## 2025-06-05 RX ADMIN — Medication 1 TABLET: at 21:05

## 2025-06-05 RX ADMIN — SODIUM CHLORIDE, POTASSIUM CHLORIDE, SODIUM LACTATE AND CALCIUM CHLORIDE: 600; 310; 30; 20 INJECTION, SOLUTION INTRAVENOUS at 11:18

## 2025-06-05 RX ADMIN — SODIUM CHLORIDE, POTASSIUM CHLORIDE, SODIUM LACTATE AND CALCIUM CHLORIDE: 600; 310; 30; 20 INJECTION, SOLUTION INTRAVENOUS at 08:39

## 2025-06-05 RX ADMIN — Medication 200 MG: at 21:06

## 2025-06-05 ASSESSMENT — PAIN SCALES - GENERAL
PAINLEVEL_OUTOF10: 4
PAINLEVEL_OUTOF10: 8
PAINLEVEL_OUTOF10: 8
PAINLEVEL_OUTOF10: 6
PAINLEVEL_OUTOF10: 7
PAINLEVEL_OUTOF10: 4
PAINLEVEL_OUTOF10: 5
PAINLEVEL_OUTOF10: 7
PAINLEVEL_OUTOF10: 6

## 2025-06-05 ASSESSMENT — PAIN DESCRIPTION - DESCRIPTORS
DESCRIPTORS: PRESSURE
DESCRIPTORS: ACHING
DESCRIPTORS: PRESSURE
DESCRIPTORS: ACHING
DESCRIPTORS: ACHING
DESCRIPTORS: PRESSURE

## 2025-06-05 ASSESSMENT — PAIN DESCRIPTION - ORIENTATION
ORIENTATION: RIGHT

## 2025-06-05 ASSESSMENT — PAIN DESCRIPTION - LOCATION
LOCATION: KNEE

## 2025-06-05 ASSESSMENT — PAIN - FUNCTIONAL ASSESSMENT
PAIN_FUNCTIONAL_ASSESSMENT: ACTIVITIES ARE NOT PREVENTED
PAIN_FUNCTIONAL_ASSESSMENT: ACTIVITIES ARE NOT PREVENTED
PAIN_FUNCTIONAL_ASSESSMENT: 0-10

## 2025-06-05 ASSESSMENT — PAIN DESCRIPTION - PAIN TYPE: TYPE: ACUTE PAIN;CHRONIC PAIN

## 2025-06-05 NOTE — DISCHARGE INSTRUCTIONS
TOTAL KNEE DISCHARGE INSTRUCTIONS      Patient: Gavin Garcias MRN: [id number]  SSN: [id number]              Please take the time to review the following instructions before you leave the hospital and use them as guidelines during your recovery from surgery.  If you have any questions you may contact my office at [phone]  After business hours or during the weekend you can contact me through [employer] [Preferred] or text / call at [phone] (cell phone) for emergency's. Please use the office number during regular business hours.    SPECIAL INSTRUCTIONS :   1. Full extension at the knee is the most important aspect of your range of motion. Avoid placing a pillow or bump behind the knee. Rather, place the heel up on a bump or pillow and allow gravity to help straighten the knee.   2. You may weight bear as tolerated on the knee and during the day you should bend the knee as much as possible.   3. Drainage from the incision more than 4 days from surgery is concerning. Contact my office if there is any question [phone].   4. You may contact me directly through [employer] if there are specific questions or text / call using my cell number [phone].      DRESSING :     Post-op Dressings : This should be removed by physical therapy or you may remove this yourself 7 days after the date of your surgery. If there is no drainage, then a simple dressing may be used or no dressing at all. Other dressing options can be purchased over the counter at a local pharmacy or medical supply vendor.        A porous adhesive dressing such as pictured above can be purchased online (Amazon) or at your local [facility] or [employer]. You only need to keep the incision covered for 7 days after showers. A dressing may be used for longer if there are issues with clothing clinging to the incision.         Showering/ Bathing:    You may shower with the Post-op dressing in place. This is left in place for 7  some degree of swelling following surgery.   Following hip and knee replacement surgery, swelling can be normal below the incision for the first few weeks.  This swelling peaks around 5-7 days after surgery.   It is not unusual to have some bruising about the back of the thigh, calf, ankle, and foot.   What should I do for the swelling?  Keep the limb elevated above the level of your heart - 'Toes above Nose'.  Apply compression socks (knee high for total knees and up to the mid-thigh for total hips).   Use the ice packs that you are discharged home with several times a day for the first several weeks.  How long should I remain on blood thinners following surgery?  30 days  Which blood thinners will I be on? Can I take them with Tylenol?   Aspirin 81 mg twice daily - these should be taken with meals and can be used with Tylenol. In certain instances, you may be sent home on Lovenox for 30 days.   For short periods of time (30 days), aspirin and anti-inflammatories (i.e. Aleve, Motrin / Advil / ibuprofen, diclofenac, etc. can be taken together).  When can I drive?  Once you have stopped using regular narcotic pain medications (Oxycodone, Percocet, Lortab, Norco etc.) and can safely apply the brakes without hesitation, (emergency braking).   When can I shower?  You may shower immediately if your Optifoam bandage is dry and without discharge.  The Optifoam dressing should be removed 7 days following surgery, after which you may continue to shower.  No submersion of the incision, bathing or swimming for 14 days following surgery or until cleared by Dr Verma.  Can I remove this dressing?  Yes, this is removed just like removing a band aid.  If you are concerned, this can be removed by your therapist.   What do I do with the dressing when I shower?  The Optifoam dressing is waterproof and you may shower with it.   The incision is sealed with Dermabond, a biologic skin glue, which also serves as a watertight seal. If your

## 2025-06-05 NOTE — ANESTHESIA PROCEDURE NOTES
Peripheral Block    Patient location during procedure: pre-op  Reason for block: procedure for pain, post-op pain management, primary anesthetic and at surgeon's request  Start time: 6/5/2025 9:31 AM  End time: 6/5/2025 9:37 AM  Staffing  Performed: anesthesiologist   Anesthesiologist: Dimas Santoyo MD  Performed by: Dimas Santoyo MD  Authorized by: Dimas Santoyo MD    Preanesthetic Checklist  Completed: patient identified, IV checked, site marked, risks and benefits discussed, surgical/procedural consents, pre-op evaluation, timeout performed, anesthesia consent given, oxygen available and monitors applied/VS acknowledged  Peripheral Block   Patient position: supine  Prep: ChloraPrep  Provider prep: mask and sterile gloves  Patient monitoring: cardiac monitor, continuous pulse ox, continuous capnometry, frequent blood pressure checks, IV access, oxygen and responsive to questions  Block type: iPacks  Laterality: right  Injection technique: single-shot  Guidance: ultrasound guided    Needle   Needle type: Other   Needle gauge: 21 G  Needle localization: ultrasound guidance  Needle length: 10 cmOther needle type: STIMUPLEX  Assessment   Injection assessment: negative aspiration for heme, no paresthesia on injection, local visualized surrounding nerve on ultrasound and no intravascular symptoms  Hemodynamics: stable  Outcomes: patient tolerated procedure well    Additional Notes  Khushbu LEE witnessed timeout and block written on correct side.

## 2025-06-05 NOTE — PLAN OF CARE
Problem: Physical Therapy - Adult  Goal: By Discharge: Performs mobility at highest level of function for planned discharge setting.  See evaluation for individualized goals.  Description: FUNCTIONAL STATUS PRIOR TO ADMISSION: Patient was modified independent using a single point cane for functional mobility.    HOME SUPPORT PRIOR TO ADMISSION: The patient lived with his spouse but didn't require assist.    Physical Therapy Goals  Initiated 6/5/2025  1.  Patient will move from supine to sit and sit to supine in bed with modified independence within 4 day(s).    2.  Patient will perform sit to stand with modified independence within 4 day(s).  3.  Patient will transfer from bed to chair and chair to bed with modified independence using the least restrictive device within 4 day(s).  4.  Patient will ambulate with modified independence for 100 feet with the least restrictive device within 4 day(s).   5.  Patient will ascend/descend 4 stairs with 1 handrail(s) with modified independence within 4 day(s).  6. Patient will perform TKA home exercise program per protocol with independence within 4 days.  7. Patient will demonstrate AROM 0-90 degrees in operative joint within 4 days.      Outcome: Progressing   PHYSICAL THERAPY EVALUATION    Patient: Gavin Garcias (79 y.o. male)  Date: 6/5/2025  Primary Diagnosis: Primary osteoarthritis of right knee [M17.11]  Procedure(s) (LRB):  RIGHT TOTAL KNEE ARTHROPLASTY  (FRANKLYN) (Right) * Day of Surgery *   Precautions: Weight Bearing Right Lower Extremity Weight Bearing: Weight Bearing As Tolerated                    ASSESSMENT :   DEFICITS/IMPAIRMENTS:   The patient is limited by decreased functional mobility, ROM, strength, body mechanics, balance, posture, increased pain levels     Based on the impairments listed above, the patient presents with anticipated impairments following admission for a right TKA. He also has a history of left TKA (pt reports it requires replacement)  Brittny BRAND. Association of AM-PAC \"6-Clicks\" Basic Mobility and Daily Activity Scores With Discharge Destination. Phys Ther. 2021 4;101(4):bxkl583. doi: 10.1093/ptj/ufxk905. PMID: 13240157.  3. Yazmin BRAND, Wil VILLARREAL, Leigh S, Pamella K, Austin S. Activity Measure for Post-Acute Care \"6-Clicks\" Basic Mobility Scores Predict Discharge Destination After Acute Care Hospitalization in Select Patient Groups: A Retrospective, Observational Study. Arch Rehabil Res Clin Transl. 2022 16;4(3):569677. doi: 10.1016/j.arrct..816704. PMID: 31369529; PMCID: [id number].  4. Royal WILLIS, Jolly S, Penelope W, Della BURNETT. AM-PAC Short Forms Manual 4.0. Revised 2020.                                                                                                                                                                                                                              Pain Ratin/10 right knee  Pain Intervention(s):   ice, rest, elevation, and repositioning    Activity Tolerance:   Fair     After treatment:   Patient left in no apparent distress sitting up in chair and Call bell within reach    COMMUNICATION/EDUCATION:   The patient's plan of care was discussed with: registered nurse    Patient Education  Education Given To: Patient  Education Provided: Role of Therapy;Plan of Care;Home Exercise Program;Fall Prevention Strategies;Mobility Training;Transfer Training;Family Education  Education Method: Verbal;Demonstration  Barriers to Learning: None  Education Outcome: Verbalized understanding    Thank you for this referral.  Rudy Hernández, PT  Minutes: 23      Physical Therapy Evaluation Charge Determination   History Examination Presentation Decision-Making   MEDIUM  Complexity : 1-2 comorbidities / personal factors will impact the outcome/ POC  MEDIUM Complexity : 3 Standardized tests and measures addressin body structure, function, activity limitation and / or participation in recreation  LOW Complexity

## 2025-06-05 NOTE — ANESTHESIA PRE PROCEDURE
Department of Anesthesiology  Preprocedure Note       Name:  Gavin Garcias   Age:  79 y.o.  :  1945                                          MRN:  [id number]         Date:  2025      Surgeon: Surgeon(s):  Arturo Verma MD    Procedure: Procedure(s):  RIGHT TOTAL KNEE ARTHROPLASTY  (FRANKLYN)    Medications prior to admission:   Prior to Admission medications    Medication Sig Start Date End Date Taking? Authorizing Provider   ibuprofen (ADVIL;MOTRIN) 800 MG tablet Take 1 tablet by mouth every 8 hours as needed for Pain 25  Yes Arturo Verma MD   acetaminophen (TYLENOL) 325 MG tablet Take 2 tablets by mouth every 4 hours 25 Yes Arturo Verma MD   oxyCODONE (ROXICODONE) 5 MG immediate release tablet Take 1 tablet by mouth every 4 hours as needed for Pain for up to 7 days. Max Daily Amount: 30 mg 25 Yes Arturo Verma MD   ondansetron (ZOFRAN-ODT) 4 MG disintegrating tablet Take 1 tablet by mouth every 8 hours as needed for Nausea or Vomiting 25  Yes Arturo Verma MD   dilTIAZem (CARDIZEM 12 HR) 120 MG extended release capsule Take 1 capsule by mouth every morning   Yes Franc Ramos MD   Ascorbic Acid (VITAMIN C) 1000 MG tablet Take 1 tablet by mouth every morning   Yes Franc Ramos MD   Multiple Vitamins-Minerals (ONE-A-DAY MENS 50+ PO) Take 1 tablet by mouth every morning   Yes Franc Ramos MD   turmeric 500 MG CAPS Take 1 capsule by mouth every morning   Yes Franc Ramos MD   Coenzyme Q10 (COQ10) 100 MG CAPS Take 1 capsule by mouth every morning   Yes Franc Ramos MD   Multiple Vitamins-Minerals (PRESERVISION AREDS 2 PO) Take 1 capsule by mouth nightly   Yes Franc Ramos MD   NONFORMULARY Take 1 tablet by mouth nightly Super Beta Prostate   Yes Franc Ramos MD   hydrALAZINE (APRESOLINE) 100 MG tablet Take 1 tablet by mouth 3 times daily For total of 150mg daily   Yes Franc Ramos MD

## 2025-06-05 NOTE — PERIOP NOTE
TRANSFER - OUT REPORT:    Verbal report given to ROSA Longo on Gavin Garcias  being transferred to [facility] for routine post-op       Report consisted of patient's Situation, Background, Assessment and   Recommendations(SBAR).     Information from the following report(s) Nurse Handoff Report was reviewed with the receiving nurse.           Lines:   Peripheral IV 06/05/25 Left;Proximal Forearm (Active)   Site Assessment Clean, dry & intact 06/05/25 1336   Line Status Infusing 06/05/25 1336   Line Care Connections checked and tightened 06/05/25 1336   Phlebitis Assessment No symptoms 06/05/25 1336   Infiltration Assessment 0 06/05/25 1336   Alcohol Cap Used Yes 06/05/25 1336   Dressing Status Clean, dry & intact 06/05/25 1336   Dressing Type Transparent 06/05/25 1336        Opportunity for questions and clarification was provided.      Patient transported with:  Tech    Report given to include meds given, patient stating he needs to receive Synthroid for his thyroid medication, nothing generic, pain level and will give Marie prior to transferring to room, voided 250mL clear yellow urine, on 4L O2.

## 2025-06-05 NOTE — OP NOTE
OPERATIVE REPORT     Preoperative Diagnosis: Primary osteoarthritis of right knee [M17.11]  Postoperative Diagnosis: Same    Procedure: Procedure(s):  RIGHT TOTAL KNEE ARTHROPLASTY  (FRANKLYN)  Surgeon: Arturo Verma MD  Assistant(s): Jose R Sotelo PA-C  Anesthesia: General   Estimated Blood Loss: 300cc  Specimens: None  Complications: None       INDICATIONS:   The patient is a 79 y.o., male who has complained of a long history of knee pain. The patient  has failed conservative treatment and presents for definitive operative care. Informed consent obtained including a discussion of the risks and benefits, which include, but are not limited to, bleeding, infection, neurovascular damage, wound complications, pain and stiffness in the knee, periprosthetic loosening, fracture dislocation and DVT, the patient consented for the procedure.     DESCRIPTION OF PROCEDURE:        The patient was seen in the preoperative holding area. The patient was positively identified. The limb was initialed,  questions were answered. The patient was given Ancef preop for an antibiotic. The patient was subsequently taken to the operating room. The patient underwent spinal anesthesia. The patient was positioned in the supine position. All bony prominences were well padded. The limb was prepped and draped in a sterile fashion. The appropriate pause for safety was performed.          A mid-line incision was created. Utilizing an incision from above the superior pole of the patella distally to the tibial tubercle. The incision was taken down through the skin and subcutaneous tissue until the retinaculum could be identified. This was sharply incised utilizing a medial parapatellar incision. The femoral array was placed at the superior portion of the incision. The tibial array was placed as well. The patella was everted, a planar resurfacing was performed and the drill guide was placed with the appropriate rotation. The medial-based synovium was  CRUCE RET CEMENTLESS BEAD W/ GIOVANNI NA [employer] [id number] Right 1 Implanted   COMPONENT PAT [id number] SUPERIOR/INFERIOR KNEE - SNA  COMPONENT PAT [id number] SUPERIOR/INFERIOR KNEE NA [employer] [id number] Right 1 Implanted   BASEPLATE TIB SZ 8 AP60MM ML85MM KEEL W58MM D28MM PEG L12MM - SNA  BASEPLATE TIB SZ 8 AP60MM ML85MM KEEL W58MM D28MM PEG L12MM NA [employer] [id number] Right 1 Implanted   INSERT TIB CR 8 12 MM POLYETH TRIATHLON X3 - SNA  INSERT TIB CR 8 12 MM POLYETH TRIATHLON X3 NA [employer] [id number] Right 1 Implanted       POST OPERATIVE CONSIDERATIONS :  WBAT    JUSTIFICATION FOR SURGICAL ASSISTANT:   Surgical Assistant, was requried and necessary in this case, to help with soft tissue retraction, extremity positioning, equiment management, implant management, and wound closure.    Arturo Verma MD

## 2025-06-05 NOTE — ANESTHESIA POSTPROCEDURE EVALUATION
Department of Anesthesiology  Postprocedure Note    Patient: Gavin Garcias  MRN: [id number]  YOB: 1945  Date of evaluation: 6/5/2025    Procedure Summary       Date: 06/05/25 Room / Location: [facility] OR [address] AMBULATORY OR    Anesthesia Start: 1021 Anesthesia Stop: 1233    Procedure: RIGHT TOTAL KNEE ARTHROPLASTY  (FRANKLYN) (Right: Knee) Diagnosis:       Primary osteoarthritis of right knee      (Primary osteoarthritis of right knee [M17.11])    Surgeons: Arturo Verma MD Responsible Provider: Dimas Santoyo MD    Anesthesia Type: general ASA Status: 3            Anesthesia Type: No value filed.    Dayday Phase I: Dayday Score: 9    Dayday Phase II:      Anesthesia Post Evaluation    Patient location during evaluation: PACU  Patient participation: complete - patient participated  Level of consciousness: awake  Pain score: 0  Airway patency: patent  Nausea & Vomiting: no nausea and no vomiting  Cardiovascular status: blood pressure returned to baseline  Respiratory status: acceptable  Hydration status: euvolemic  Multimodal analgesia pain management approach  Pain management: adequate    No notable events documented.

## 2025-06-06 VITALS
TEMPERATURE: 98.2 F | HEIGHT: 73 IN | DIASTOLIC BLOOD PRESSURE: 67 MMHG | BODY MASS INDEX: 36.45 KG/M2 | RESPIRATION RATE: 18 BRPM | OXYGEN SATURATION: 91 % | HEART RATE: 103 BPM | SYSTOLIC BLOOD PRESSURE: 143 MMHG | WEIGHT: 275 LBS

## 2025-06-06 LAB
ANION GAP SERPL CALC-SCNC: 6 MMOL/L (ref 2–12)
BUN SERPL-MCNC: 25 MG/DL (ref 6–20)
BUN/CREAT SERPL: 24 (ref 12–20)
CALCIUM SERPL-MCNC: 8.2 MG/DL (ref 8.5–10.1)
CHLORIDE SERPL-SCNC: 98 MMOL/L (ref 97–108)
CO2 SERPL-SCNC: 32 MMOL/L (ref 21–32)
CREAT SERPL-MCNC: 1.04 MG/DL (ref 0.7–1.3)
GLUCOSE SERPL-MCNC: 103 MG/DL (ref 65–100)
HCT VFR BLD AUTO: 31.7 % (ref 36.6–50.3)
HGB BLD-MCNC: 10.1 G/DL (ref 12.1–17)
POTASSIUM SERPL-SCNC: 3.7 MMOL/L (ref 3.5–5.1)
SODIUM SERPL-SCNC: 136 MMOL/L (ref 136–145)

## 2025-06-06 PROCEDURE — G0378 HOSPITAL OBSERVATION PER HR: HCPCS

## 2025-06-06 PROCEDURE — 97116 GAIT TRAINING THERAPY: CPT

## 2025-06-06 PROCEDURE — 97165 OT EVAL LOW COMPLEX 30 MIN: CPT

## 2025-06-06 PROCEDURE — 96374 THER/PROPH/DIAG INJ IV PUSH: CPT

## 2025-06-06 PROCEDURE — APPNB180 APP NON BILLABLE TIME > 60 MINS: Performed by: NURSE PRACTITIONER

## 2025-06-06 PROCEDURE — 2580000003 HC RX 258: Performed by: PHYSICIAN ASSISTANT

## 2025-06-06 PROCEDURE — 80048 BASIC METABOLIC PNL TOTAL CA: CPT

## 2025-06-06 PROCEDURE — 6370000000 HC RX 637 (ALT 250 FOR IP): Performed by: ORTHOPAEDIC SURGERY

## 2025-06-06 PROCEDURE — 2500000003 HC RX 250 WO HCPCS: Performed by: PHYSICIAN ASSISTANT

## 2025-06-06 PROCEDURE — 85018 HEMOGLOBIN: CPT

## 2025-06-06 PROCEDURE — 97530 THERAPEUTIC ACTIVITIES: CPT

## 2025-06-06 PROCEDURE — 6370000000 HC RX 637 (ALT 250 FOR IP): Performed by: PHYSICIAN ASSISTANT

## 2025-06-06 PROCEDURE — 94761 N-INVAS EAR/PLS OXIMETRY MLT: CPT

## 2025-06-06 PROCEDURE — 97110 THERAPEUTIC EXERCISES: CPT

## 2025-06-06 PROCEDURE — 97535 SELF CARE MNGMENT TRAINING: CPT

## 2025-06-06 PROCEDURE — 6360000002 HC RX W HCPCS: Performed by: PHYSICIAN ASSISTANT

## 2025-06-06 PROCEDURE — 85014 HEMATOCRIT: CPT

## 2025-06-06 RX ORDER — ASPIRIN 81 MG/1
81 TABLET ORAL 2 TIMES DAILY
Qty: 60 TABLET | Refills: 0 | Status: SHIPPED | OUTPATIENT
Start: 2025-06-06 | End: 2025-07-06

## 2025-06-06 RX ADMIN — ACETAMINOPHEN 650 MG: 325 TABLET ORAL at 11:55

## 2025-06-06 RX ADMIN — DILTIAZEM HYDROCHLORIDE 120 MG: 60 CAPSULE, EXTENDED RELEASE ORAL at 09:00

## 2025-06-06 RX ADMIN — ACETAMINOPHEN 650 MG: 325 TABLET ORAL at 03:08

## 2025-06-06 RX ADMIN — OXYCODONE HYDROCHLORIDE AND ACETAMINOPHEN 1000 MG: 500 TABLET ORAL at 09:01

## 2025-06-06 RX ADMIN — LEVOTHYROXINE SODIUM 175 MCG: 0.12 TABLET ORAL at 09:01

## 2025-06-06 RX ADMIN — HYDRALAZINE HYDROCHLORIDE 100 MG: 25 TABLET ORAL at 09:00

## 2025-06-06 RX ADMIN — SODIUM CHLORIDE: 0.9 INJECTION, SOLUTION INTRAVENOUS at 01:20

## 2025-06-06 RX ADMIN — CEFAZOLIN 3000 MG: 3 INJECTION, POWDER, FOR SOLUTION INTRAVENOUS at 03:09

## 2025-06-06 RX ADMIN — ASPIRIN 81 MG: 81 TABLET, COATED ORAL at 09:01

## 2025-06-06 RX ADMIN — OXYCODONE 5 MG: 5 TABLET ORAL at 11:57

## 2025-06-06 RX ADMIN — Medication 100 MG: at 09:00

## 2025-06-06 RX ADMIN — HYDROMORPHONE HYDROCHLORIDE 1 MG: 1 INJECTION, SOLUTION INTRAMUSCULAR; INTRAVENOUS; SUBCUTANEOUS at 03:13

## 2025-06-06 RX ADMIN — OXYCODONE 10 MG: 5 TABLET ORAL at 09:02

## 2025-06-06 RX ADMIN — CYANOCOBALAMIN TAB 500 MCG 1000 MCG: 500 TAB at 09:00

## 2025-06-06 RX ADMIN — MULTIVITAMIN TABLET 1 TABLET: TABLET at 09:01

## 2025-06-06 RX ADMIN — SODIUM CHLORIDE, PRESERVATIVE FREE 10 ML: 5 INJECTION INTRAVENOUS at 09:04

## 2025-06-06 RX ADMIN — OXYCODONE 10 MG: 5 TABLET ORAL at 00:54

## 2025-06-06 RX ADMIN — FAMOTIDINE 20 MG: 20 TABLET, FILM COATED ORAL at 09:00

## 2025-06-06 ASSESSMENT — PAIN DESCRIPTION - DESCRIPTORS
DESCRIPTORS: ACHING
DESCRIPTORS: ACHING
DESCRIPTORS: ACHING;SORE
DESCRIPTORS: ACHING;SORE
DESCRIPTORS: ACHING

## 2025-06-06 ASSESSMENT — PAIN SCALES - GENERAL
PAINLEVEL_OUTOF10: 7
PAINLEVEL_OUTOF10: 4
PAINLEVEL_OUTOF10: 6
PAINLEVEL_OUTOF10: 3
PAINLEVEL_OUTOF10: 5
PAINLEVEL_OUTOF10: 6
PAINLEVEL_OUTOF10: 0

## 2025-06-06 ASSESSMENT — PAIN DESCRIPTION - LOCATION
LOCATION: KNEE

## 2025-06-06 ASSESSMENT — PAIN DESCRIPTION - ORIENTATION
ORIENTATION: RIGHT

## 2025-06-06 ASSESSMENT — PAIN - FUNCTIONAL ASSESSMENT
PAIN_FUNCTIONAL_ASSESSMENT: ACTIVITIES ARE NOT PREVENTED
PAIN_FUNCTIONAL_ASSESSMENT: ACTIVITIES ARE NOT PREVENTED

## 2025-06-06 NOTE — PROGRESS NOTES
Discharge instructions provide to patient and wife. Patient and wife provide with discharge instructions and SSI prevention pack. Patient verbalizes understanding on discharge instructions provide.

## 2025-06-06 NOTE — PROGRESS NOTES
Orthopaedic Progress Note  Post Op day: 1 Day Post-Op        Patient: Gavin Garcias MRN: [id number]  SSN: [id number]    YOB: 1945  Age: 79 y.o.  Sex: male      Admit date:  2025  Date of Surgery:  25  Procedures:  Procedure(s):  RIGHT TOTAL KNEE ARTHROPLASTY  (FRANKLYN)  Admitting Physician:  Arturo Verma MD   Surgeon:  Surgeons and Role:     * Arturo Verma MD - Primary    Consulting Physician(s): Treatment Team:   Arturo Verma MD Gooding, Kimberly, Anamika Nguyen, Alexia Cobb Stephanie M, PTA Babson, Isabelle, OT Scott, Theresa, RN Judson, Martha, RN    SUBJECTIVE:     Gavin Garcias is a 79 y.o. male who is 1 Day Post-Op s/p Procedure(s):  RIGHT TOTAL KNEE ARTHROPLASTY  (FRANKLYN) with an appropriate level of post-operative pain.  Patient sitting up in the chair during my exam. Spouse is present in the room. He needs to practice stairs prior to discharge home. No current complaints.    OBJECTIVE:       Physical Exam:  General: Alert, cooperative, no distress.    Respiratory: Respirations unlabored  Neurological:  Neurovascular exam within normal limits.  Motor: + DF/PF.   Dressing/Wound RLE:  Mepilex surgical dressing to right knee with three moderate sized spots of old, sanginous drainage. Few small blisters also noted to lateral edge of dressing. No active drainage noted. Mild ecchymosis and swelling noted around dressing.       Vital Signs:      Patient Vitals for the past 8 hrs:   BP Temp Temp src Pulse Resp SpO2   25 1157 -- -- -- -- 18 --   25 1145 (!) 143/67 98.2 °F (36.8 °C) Oral (!) 103 16 91 %   25 0932 -- -- -- -- 18 --   25 0902 -- -- -- -- (!) 7 --   25 0859 139/77 98.1 °F (36.7 °C) Oral (!) 104 18 92 %   25 0856 (!) 159/66 -- -- (!) 111 -- 97 %   25 0833 (!) 148/64 -- -- (!) 101 -- 92 %                                          Temp (24hrs), Av.7 °F (36.5 °C), Min:97.2 °F (36.2 °C),

## 2025-06-06 NOTE — PROGRESS NOTES
Walked by patient room, he independently was up in the bathroom. Stated the nurses said he could do that.  Assisted patient back to chair.  Reminded patient to call for any mobility assistance, he is not to be up with out assistance. Patient states he will not fall.  Patient and wife state understanding of calling for help to mobilize.

## 2025-06-06 NOTE — PLAN OF CARE
Problem: Physical Therapy - Adult  Goal: By Discharge: Performs mobility at highest level of function for planned discharge setting.  See evaluation for individualized goals.  Description: FUNCTIONAL STATUS PRIOR TO ADMISSION: Patient was modified independent using a single point cane for functional mobility.    HOME SUPPORT PRIOR TO ADMISSION: The patient lived with his spouse but didn't require assist.    Physical Therapy Goals  Initiated 6/5/2025  1.  Patient will move from supine to sit and sit to supine in bed with modified independence within 4 day(s).    2.  Patient will perform sit to stand with modified independence within 4 day(s).  3.  Patient will transfer from bed to chair and chair to bed with modified independence using the least restrictive device within 4 day(s).  4.  Patient will ambulate with modified independence for 100 feet with the least restrictive device within 4 day(s).   5.  Patient will ascend/descend 4 stairs with 1 handrail(s) with modified independence within 4 day(s).  6. Patient will perform TKA home exercise program per protocol with independence within 4 days.  7. Patient will demonstrate AROM 0-90 degrees in operative joint within 4 days.      Outcome: Progressing   PHYSICAL THERAPY TREATMENT    Patient: Gavin Garcias (79 y.o. male)  Date: 6/6/2025  Diagnosis: Primary osteoarthritis of right knee [M17.11] Primary osteoarthritis of right knee  Procedure(s) (LRB):  RIGHT TOTAL KNEE ARTHROPLASTY  (FRANKLYN) (Right) 1 Day Post-Op  Precautions: Restrictions/Precautions  Restrictions/Precautions: Weight Bearing Lower Extremity Weight Bearing Restrictions  Right Lower Extremity Weight Bearing: Weight Bearing As Tolerated          ASSESSMENT:  Patient continues to benefit from skilled PT services and is progressing towards goals. Pt anxious for PT. Demonstrates good QS and able to SLR independently with 10 degree quad lag. Demonstrates fwd flexed posture with RW support tends to    Knee Flexion Stretch   []                                        []                                        []                                        []                                           Straight Leg Raises   [x]                                        []                                        []                                        []                                                                                                                                                                                                                                                                  Pain Ratin/10   Pain Intervention(s):   nursing notified and addressing and rest    Activity Tolerance:   Good    After treatment:   Patient left in no apparent distress sitting up in chair, Call bell within reach, and Caregiver / family present      COMMUNICATION/EDUCATION:   The patient's plan of care was discussed with: registered nurse    Patient Education  Education Given To: Patient  Education Provided: Role of Therapy;Plan of Care;Home Exercise Program;Fall Prevention Strategies;Mobility Training;Transfer Training;Family Education  Education Method: Verbal;Demonstration  Barriers to Learning: None  Education Outcome: Verbalized understanding;Continued education needed      Adriana Agee PTA  Minutes: 23

## 2025-06-06 NOTE — PLAN OF CARE
OCCUPATIONAL THERAPY EVALUATION/DISCHARGE  Patient: Gavin Garcias (79 y.o. male)  Date: 6/6/2025  Primary Diagnosis: Primary osteoarthritis of right knee [M17.11]  Procedure(s) (LRB):  RIGHT TOTAL KNEE ARTHROPLASTY  (FRANKLYN) (Right) 1 Day Post-Op     Precautions: Weight Bearing Right Lower Extremity Weight Bearing: Weight Bearing As Tolerated                ASSESSMENT :  Pt presents on admission s/p R TKA, POD #1. Pt demonstrates need for Min A for LB ADLs due to currently limited functional reach but states wife helps him at home. Pt completes functional mobility using RW CGA progressing to SBA for participation with toileting and grooming routines in bathroom. Educated pt and pt wife on home safety with both verbalizing understanding. Based on the objective data below, the patient has no further acute OT needs. Pt plans on following up with outpatient therapy next week.    Functional Outcome Measure:  The patient scored 22 on the ADL [facility] outcome measure which is indicative of decreased need for IRF/SNF at discharge.      Further skilled acute occupational therapy is not indicated at this time.     PLAN :  Recommend with staff: Mobility into bathroom with RW    Recommendation for discharge: (in order for the patient to meet his/her long term goals):   No skilled occupational therapy    Other factors to consider for discharge: no additional factors    IF patient discharges home will need the following DME: none     SUBJECTIVE:   Patient stated, “My wife helps me at home.”    OBJECTIVE DATA SUMMARY:     Past Medical History:   Diagnosis Date    Acquired lymphedema     Adverse effect of anesthesia     Unable to get Spinal Anesthesia d/t Lumbar Fusion    Arthritis of right knee     Diverticulosis     GERD (gastroesophageal reflux disease)     History of blood transfusion     HTN (hypertension)     Hyperlipemia     Obesity     Paroxysmal A-fib (HCC)     Poor historian     Postprocedural hypothyroidism     Thyroid  Education  Education Method: Verbal;Demonstration  Barriers to Learning: None  Education Outcome: Verbalized understanding;Demonstrated understanding    Thank you for this referral.  Ewa Mcclellan  Minutes: 20    Occupational Therapy Evaluation Charge Determination   History Examination Decision-Making   LOW Complexity : Brief history review  LOW Complexity: 1-3 Performance deficits relating to physical, cognitive, or psychosocial skills that result in activity limitations and/or participation restrictions LOW Complexity: No comorbidities that affect functional and  no verbal  or physical assist needed to complete eval tasks   Based on the above components, the patient evaluation is determined to be of the following complexity level: Low    Regarding student involvement in patient care:  A student participated in this treatment session. Per CMS Medicare statements and AOTA guidelines I certify that the following was true:  1. I was present and directly observed the entire session.  2. I made all skilled judgments and clinical decisions regarding care.  3. I am the practitioner responsible for assessment, treatment, and documentation.

## 2025-06-06 NOTE — PROGRESS NOTES
Rounded on patient  Patient alert and oriented  Follow up from pre-op Joint Patient Education Class.   Patient states class information was valuable in preparing for surgery.   Patient states their home space is prepared and safe for recovery.   Reviewed plan of care with patient, including pain management, positioning,  mobility precautions, ice application, leg positioning, exercises and incentive spirometry use.   Reviewed call don't fall expectations.  Opportunity provided for patient to ask questions and provide comments.  Questions answered.  Wife at bedside  Patient states he is ready to go home.  Reminded him that PT will be back to work with him.

## 2025-06-06 NOTE — PLAN OF CARE
Problem: Physical Therapy - Adult  Goal: By Discharge: Performs mobility at highest level of function for planned discharge setting.  See evaluation for individualized goals.  Description: FUNCTIONAL STATUS PRIOR TO ADMISSION: Patient was modified independent using a single point cane for functional mobility.    HOME SUPPORT PRIOR TO ADMISSION: The patient lived with his spouse but didn't require assist.    Physical Therapy Goals  Initiated 6/5/2025  1.  Patient will move from supine to sit and sit to supine in bed with modified independence within 4 day(s).    2.  Patient will perform sit to stand with modified independence within 4 day(s).  3.  Patient will transfer from bed to chair and chair to bed with modified independence using the least restrictive device within 4 day(s).  4.  Patient will ambulate with modified independence for 100 feet with the least restrictive device within 4 day(s).   5.  Patient will ascend/descend 4 stairs with 1 handrail(s) with modified independence within 4 day(s).  6. Patient will perform TKA home exercise program per protocol with independence within 4 days.  7. Patient will demonstrate AROM 0-90 degrees in operative joint within 4 days.      6/6/2025 1214 by Adriana Agee, PTA  Outcome: Progressing  6/6/2025 0858 by Adriana Agee, PTA  Outcome: Progressing   PHYSICAL THERAPY TREATMENT    Patient: Gavin Garcias (79 y.o. male)  Date: 6/6/2025  Diagnosis: Primary osteoarthritis of right knee [M17.11] Primary osteoarthritis of right knee  Procedure(s) (LRB):  RIGHT TOTAL KNEE ARTHROPLASTY  (FRAKNLYN) (Right) 1 Day Post-Op  Precautions: Restrictions/Precautions  Restrictions/Precautions: Weight Bearing Lower Extremity Weight Bearing Restrictions  Right Lower Extremity Weight Bearing: Weight Bearing As Tolerated          ASSESSMENT:  Patient continues to benefit from skilled PT services and is progressing towards goals. Moderate bloody drainage noted on dressing. No knee                                   []                                          [x]                                          []                                           Heel Slides   []                                        []                                        []                                        []                                           Long Arc Quads   []                                        []                                        []                                        []                                           Knee Flexion Stretch   []                                        []                                        []                                        []                                           Straight Leg Raises   [x]                                        [x]                                        []                                        []                                                                                                                                                                                                                                                                  Pain Ratin/10   Pain Intervention(s):   ice, rest, and elevation    Activity Tolerance:   Good    After treatment:   Patient left in no apparent distress sitting up in chair, Call bell within reach, and Caregiver / family present      COMMUNICATION/EDUCATION:   The patient's plan of care was discussed with: registered nurse    Patient Education  Education Given To: Patient  Education Provided: Role of Therapy;Plan of Care;Home Exercise Program;Fall Prevention Strategies;Mobility Training;Transfer Training;Family Education  Education Method: Verbal;Demonstration  Barriers to Learning: Readiness to Learn  Education Outcome: Verbalized understanding;Continued education needed      Adriana Agee, JOAN  Minutes: 29     None

## 2025-07-29 ENCOUNTER — TRANSCRIBE ORDERS (OUTPATIENT)
Facility: HOSPITAL | Age: 80
End: 2025-07-29

## 2025-07-29 DIAGNOSIS — Z96.652 STATUS POST TOTAL LEFT KNEE REPLACEMENT: Primary | ICD-10-CM

## 2025-08-19 ENCOUNTER — TRANSCRIBE ORDERS (OUTPATIENT)
Facility: HOSPITAL | Age: 80
End: 2025-08-19

## 2025-08-19 DIAGNOSIS — S82.872K CLOSED DISPLACED PILON FRACTURE OF LEFT TIBIA WITH NONUNION: Primary | ICD-10-CM

## (undated) DEVICE — GLOVE SURG SZ 8 L12IN FNGR THK79MIL GRN LTX FREE

## (undated) DEVICE — PADDING CST 6IN STERILE --

## (undated) DEVICE — 450 ML BOTTLE OF 0.05% CHLORHEXIDINE GLUCONATE IN 99.95% STERILE WATER FOR IRRIGATION, USP AND APPLICATOR.: Brand: IRRISEPT ANTIMICROBIAL WOUND LAVAGE

## (undated) DEVICE — CANISTER, RIGID, 3000CC: Brand: MEDLINE INDUSTRIES, INC.

## (undated) DEVICE — BANDAGE COMPR W6INXL5YD SELF ADH COHESIVE CO FLX

## (undated) DEVICE — Device: Brand: JELCO

## (undated) DEVICE — 4-PORT MANIFOLD: Brand: NEPTUNE 2

## (undated) DEVICE — Device

## (undated) DEVICE — SUTURE STRATAFIX SYMMETRIC SZ 1 L18IN ABSRB VLT CT1 L36CM SXPP1A404

## (undated) DEVICE — STERILE POLYISOPRENE POWDER-FREE SURGICAL GLOVES: Brand: PROTEXIS

## (undated) DEVICE — STRIP WND CLOSURE L 32 X W 2.5 CM SILK MESH ADH STRL DISP 50

## (undated) DEVICE — KIT DRP FOR RIO ROBOTIC ARM ASST SYS

## (undated) DEVICE — SOLUTION IRRIG 1000ML STRL H2O USP PLAS POUR BTL

## (undated) DEVICE — BANDAGE COMPR M W6INXL10YD WHT BGE VELC E MTRX HK AND LOOP

## (undated) DEVICE — ROCKER SWITCH PENCIL BLADE ELECTRODE, HOLSTER: Brand: EDGE

## (undated) DEVICE — SUT ETHLN 3-0 18IN PS2 BLK --

## (undated) DEVICE — GLOVE SURG SZ 85 L12IN FNGR THK79MIL GRN LTX FREE

## (undated) DEVICE — SOLUTION IV 1000 ML 0.9 NACL INJ USP EXCEL PLAS CONTAINER

## (undated) DEVICE — SPONGE GZ W4XL4IN COT 12 PLY TYP VII WVN C FLD DSGN

## (undated) DEVICE — STERILE POLYISOPRENE POWDER-FREE SURGICAL GLOVES WITH EMOLLIENT COATING: Brand: PROTEXIS

## (undated) DEVICE — EXTREMITY-SFMCASU: Brand: MEDLINE INDUSTRIES, INC.

## (undated) DEVICE — REM POLYHESIVE ADULT PATIENT RETURN ELECTRODE: Brand: VALLEYLAB

## (undated) DEVICE — 1010 S-DRAPE TOWEL DRAPE 10/BX: Brand: STERI-DRAPE™

## (undated) DEVICE — C-ARM: Brand: UNBRANDED

## (undated) DEVICE — DECANTER BAG 9": Brand: MEDLINE INDUSTRIES, INC.

## (undated) DEVICE — PAD,ABDOMINAL,5"X9",ST,LF,25/BX: Brand: MEDLINE INDUSTRIES, INC.

## (undated) DEVICE — SUTURE VICRYL SZ 2-0 L36IN ABSRB UD L36MM CT-1 1/2 CIR J945H

## (undated) DEVICE — SOLUTION WND IRRIGATION 450 ML 0.5 PVP-I 0.9 NACL

## (undated) DEVICE — SUTURE VCRL SZ 2-0 L27IN ABSRB UD L36MM CP-1 1/2 CIR REV J266H

## (undated) DEVICE — INTENDED FOR TISSUE SEPARATION, AND OTHER PROCEDURES THAT REQUIRE A SHARP SURGICAL BLADE TO PUNCTURE OR CUT.: Brand: BARD-PARKER ® CARBON RIB-BACK BLADES

## (undated) DEVICE — DRAPE,EXTREMITY,89X128,STERILE: Brand: MEDLINE

## (undated) DEVICE — STOCKINETTE: Brand: DEROYAL

## (undated) DEVICE — NEEDLE HYPO 22GA L1.5IN BLK S STL HUB POLYPR SHLD REG BVL

## (undated) DEVICE — BANDAGE COMPR W6INXL10YD ST M E WHITE/BEIGE

## (undated) DEVICE — SYRINGE MED 30ML STD CLR PLAS LUERLOCK TIP N CTRL DISP

## (undated) DEVICE — GLOVE SURG SZ 85 L12IN FNGR ORTHO 126MIL CRM LTX FREE

## (undated) DEVICE — COVER,MAYO STAND,STERILE: Brand: MEDLINE

## (undated) DEVICE — DUAL CUT SAGITTAL BLADE

## (undated) DEVICE — SUTURE VICRYL + SZ 1-0 L36IN ABSRB UD CTX 1/2 CIR TAPR PNT VCP977H

## (undated) DEVICE — TOTAL JOINT-SFMC: Brand: MEDLINE INDUSTRIES, INC.

## (undated) DEVICE — SUTURE MONOCRYL + SZ 3-0 L27IN ABSRB UD PS1 L24MM 3/8 CIR REV MCP936H

## (undated) DEVICE — PADDING CAST 4 INX5 YD STRL

## (undated) DEVICE — SUTURE ETHILON SZ 3-0 L18IN NONABSORBABLE BLK L24MM PS-1 3/8 1663G

## (undated) DEVICE — ELECTRODE BLDE L4IN NONINSULATED EDGE

## (undated) DEVICE — SPONGE GZ W4XL4IN COT 12 PLY TYP VII WVN C FLD DSGN STERILE

## (undated) DEVICE — ZIMMER® STERILE DISPOSABLE TOURNIQUET CUFF WITH PROTECTIVE SLEEVE AND PLC, DUAL PORT, SINGLE BLADDER, 34 IN. (86 CM)

## (undated) DEVICE — NEEDLE,HYPODERM,SAFETY,18GX1.5: Brand: MEDLINE

## (undated) DEVICE — GLOVE ORTHO 8   MSG9480

## (undated) DEVICE — PENCIL SMK EVAC ALL IN 1 DSGN ENH VISIBILITY IMPROVED AIR

## (undated) DEVICE — BLADE SURG SAW STD S STL OSC W/ SERR EDGE DISP

## (undated) DEVICE — YANKAUER,SMOOTH HANDLE,HIGH CAPACITY: Brand: MEDLINE INDUSTRIES, INC.

## (undated) DEVICE — APPLICATOR MEDICATED 26 CC SOLUTION HI LT ORNG CHLORAPREP

## (undated) DEVICE — GLOVE ORANGE PI 7 1/2   MSG9075

## (undated) DEVICE — SYR 20ML LL STRL LF --

## (undated) DEVICE — NEEDLE HYPO 21GA L1.5IN INTRAMUSCULAR S STL LATCH BVL UP

## (undated) DEVICE — BANDAGE COBAN 6 IN WND 6INX5YD FOAM

## (undated) DEVICE — COVER LT HNDL PLAS RIG 1 PER PK

## (undated) DEVICE — DRESSING,GAUZE,XEROFORM,CURAD,1"X8",ST: Brand: CURAD

## (undated) DEVICE — KIT TRK KNEE PROC VIZADISC

## (undated) DEVICE — SYSTEM NAVIGATION PALM SZ PRECIS ALIGN TECHNOLOGY DISP FOR

## (undated) DEVICE — STRYKER PERFORMANCE SERIES SAGITTAL BLADE: Brand: STRYKER PERFORMANCE SERIES

## (undated) DEVICE — STRIP WND CLOSURE L 32 X W 2.5 CM SILK MESH ADH STRL DISP 10

## (undated) DEVICE — HOOD: Brand: FLYTE

## (undated) DEVICE — YANKAUER,OPEN TIP,W/O VENT,STERILE: Brand: MEDLINE INDUSTRIES, INC.

## (undated) DEVICE — GLOVE SURG SZ 9 L12IN FNGR THK79MIL GRN LTX FREE

## (undated) DEVICE — RUBBERBAND FASTENING W0.25XL3.5IN 5 PER PK

## (undated) DEVICE — GOWN,SIRUS,FABRNF,XL,20/CS: Brand: MEDLINE

## (undated) DEVICE — DISPOSABLE TOURNIQUET CUFF SINGLE BLADDER, SINGLE PORT AND QUICK CONNECT CONNECTOR: Brand: COLOR CUFF

## (undated) DEVICE — SUTURE STRATAFIX SPRL SZ 1 L14IN ABSRB VLT L48CM CTX 1/2 SXPD2B405

## (undated) DEVICE — DRESSING ANTIMIC FOAM OPTIFOAM POSTOP ADH 4X14 IN

## (undated) DEVICE — SUTURE VICRYL SZ 0 L36IN ABSRB UD CT-1 L36MM 1/2 CIR TAPR PNT VCP946H

## (undated) DEVICE — BANDAGE,GAUZE,CONFORMING,3"X75",STRL,LF: Brand: MEDLINE

## (undated) DEVICE — PADDING UNDERCAST W4INXL12FT RAYON POLY SYN NONADHESIVE

## (undated) DEVICE — SUTURE FIBERWIRE 2 L97CM NONABSORBABLE BLU L36.6MM 1/2 CIR AR7206

## (undated) DEVICE — SOLUTION IRRIG 1000ML H2O PIC PLAS SHATTERPROOF CONTAINER

## (undated) DEVICE — TAPE,CLOTH/SILK,CURAD,3"X10YD,LF,40/CS: Brand: CURAD

## (undated) DEVICE — PIN BONE 3.2 X 140MM

## (undated) DEVICE — HOOD: Brand: FLYTE, SURGICOOL

## (undated) DEVICE — SUTURE MONOCRYL STRATAFIX SPRL + 3 0 SGL ARMED PS 1 24 IN LEN SXMP1B103

## (undated) DEVICE — SOLUTION IRRIG 3000ML 0.9% SOD CHL USP UROMATIC PLAS CONT